# Patient Record
Sex: FEMALE | Race: WHITE | NOT HISPANIC OR LATINO | Employment: FULL TIME | URBAN - METROPOLITAN AREA
[De-identification: names, ages, dates, MRNs, and addresses within clinical notes are randomized per-mention and may not be internally consistent; named-entity substitution may affect disease eponyms.]

---

## 2020-04-07 ENCOUNTER — VIRTUAL VISIT (OUTPATIENT)
Dept: FAMILY MEDICINE | Facility: OTHER | Age: 30
End: 2020-04-07

## 2020-04-07 ENCOUNTER — NURSE TRIAGE (OUTPATIENT)
Dept: NURSING | Facility: CLINIC | Age: 30
End: 2020-04-07

## 2020-04-07 NOTE — TELEPHONE ENCOUNTER
Patient calling. States she had symptoms of sore throat, fever a few weeks ago so she completed 2 weeks of self quarantine. Now she wants to go back to work and employer said she needs a note to return.  Julissa Montes RN  [facility]

## 2020-04-07 NOTE — PROGRESS NOTES
"Date: 2020 16:20:22  Clinician: Yariel Torres  Clinician NPI: [id number]  Patient: Jacquelin De Leon  Patient : 1990  Patient Address: [address] [place] [address]  Patient Phone: [phone]  Visit Protocol: URI  Patient Summary:  Jacquelin is a 29 year old ( : 1990 ) female who initiated a Visit for COVID-19 (Coronavirus) evaluation and screening. When asked the question \"Please sign me up to receive news, health information and promotions. \", Jacquelin responded \"No\".    Jacquelin states her symptoms started gradually 2-3 weeks ago.   Jacquelin denies having rhinitis, facial pain or pressure, myalgias, sore throat, cough, nasal congestion, malaise, ear pain, enlarged lymph nodes, chills, diarrhea, vomiting, nausea, teeth pain, headache, fever, and wheezing. She also denies taking antibiotic medication for the symptoms, having recent facial or sinus surgery in the past 60 days, and double sickening (worsening symptoms after initial improvement). She is not experiencing dyspnea.    Pertinent COVID-19 (Coronavirus) information  Jacquelin has not traveled internationally or to the areas where COVID-19 (Coronavirus) is widespread, including cruise ship travel in the last 14 days before the start of her symptoms.   Jacquelin has not had a close contact with a laboratory-confirmed COVID-19 patient within 14 days of symptom onset. She has had a close contact with a suspected COVID-19 patient within 14 days of symptom onset. Additional information about contact with COVID-19 (Coronavirus) patient as reported by the patient (free text): I worked with two people that began to self quarantine as they came into contact with people displaying symptoms.   Jacquelin does not work or volunteer as healthcare worker or a  and does not work or volunteer in a healthcare facility. She does not live with a healthcare worker.   Triage Point(s) temporarily suspended for COVID-19 (Coronavirus) " "screening  Jacquelin reported the following symptoms which were previously protocol referral points. These protocol referral points have temporarily been removed for purposes of COVID-19 (Coronavirus) screening.   Denied all URI symptoms   Pertinent medical history  Jacquelin does not get yeast infections when she takes antibiotics.   Jacquelin needs a return to work/school note.   Weight: 190 lbs   Jacquelin smokes or uses smokeless tobacco.   She denies pregnancy and denies breastfeeding. She has menstruated in the past month.   Weight: 190 lbs  A synchronous phone visit was initiated by the provider for the following reason: To clarify exposure    MEDICATIONS: No current medications, ALLERGIES: NKDA  Clinician Response:  Dear Jacquelin,   Based on your report above, you have no symptoms of COVID-19,  it does not appear you need Coronavirus (COVID-19) testing at this time. Given what you have reported about your exposure, I would not recommend any additional isolation over the current CDC social distancing guidelines and frequent hand washing (see \"how can I protect myself...\" portion of handout included). If you develop symptoms, or the folks that you were in contact with develop symptoms, please make another oncare visit as our recommendations for isolation will likely change (see self monitoring section of handout included). Thank you and be well.      COVID-19 (Coronavirus) General Information  With the increase in the number of COVID-19 (Coronavirus) cases, we understand you may have some questions. Below is some helpful information on COVID-19 (Coronavirus).  How can I protect myself and others from the COVID-19 (Coronavirus)?  Because there is currently no vaccine to prevent infection, the best way to protect yourself is to avoid being exposed to this virus. Put distance between yourself and other people if COVID-19 (Coronavirus) is spreading in your community. The virus is thought to spread mainly from " person-to-person.     Between people who are in close contact with one another (within about 6 about) for a prolonged period (10 minutes or longer).    Through respiratory droplets produced when an infected person coughs or sneezes.     The CDC recommends the following additional steps to protect yourself and others:     Wash your hands often with soap and water for at least 20 seconds, especially after blowing your nose, coughing, or sneezing; going to the bathroom; and before eating or preparing food.  Use an alcohol-based hand  that contains at least 60 percent alcohol if soap and water are not available.        Avoid touching your eyes, nose and mouth with unwashed hands.    Avoid close contact with people who are sick.    Stay home when you are sick.    Cover your cough or sneeze with a tissue, then throw the tissue in the trash.    Clean and disinfect frequently touched objects and surfaces.     You can help stop COVID-19 (Coronavirus) by knowing the signs and symptoms:     Fever    Cough    Shortness of breath     Contact your healthcare provider if   Develop symptoms   AND   Have been in close contact with a person known to have COVID-19 (Coronavirus) or live in or have recently traveled from an area with ongoing spread of COVID-19 (Coronavirus). Call ahead before you go to a doctor's office or emergency room. Tell them about your recent travel and your symptoms.   For the most up to date information, visit the CDC's website.  Self-monitoring  Self-monitoring means people should monitor themselves for fever by taking their temperatures twice a day and remain alert for a cough or difficulty breathing.  It is important to check your health two times each day for 14 days after a potential exposure to a person with COVID-19 (Coronavirus) or after travel from a location where COVID-19 (Coronavirus) is widespread. If you have been exposed to a person with COVID-19 (Coronavirus), it may take up to 14 days  to know if you will get sick. Follow the steps below to check and record your health.     Take your temperature with a thermometer twice a day, once in the morning and once in the evening, and watch for a cough or difficulty breathing for 14 days.    Write down your temperature and any COVID-19 symptoms you may have: feeling feverish, coughing, or difficulty breathing.    Stay home from work or school.    Do not take public transportation, taxis, or ride-shares.    Avoid crowded places (such as shopping centers and movie theaters) and limit your activities in public.    Keep your distance from others (about 6 feet or 2 meters).    If you get sick with fever, cough, or trouble breathing, contact your healthcare provider and tell them about your recent travel and/or your symptoms.    If you need to seek medical care for other reasons, such as dialysis, call ahead to your doctor and tell them about your recent travel.     Steps to help prevent the spread of COVID-19 (Coronavirus) if you are sick  If you are sick with COVID-19 (Coronavirus) or suspect you are infected with the virus that causes COVID-19 (Coronavirus), follow the steps below to help prevent the disease from spreading&nbsp;to people in your home and community.     Stay home except to get medical care. Home isolation may be started in consultation with your healthcare clinician.    Separate yourself from other people and animals in your home.    Call ahead before visiting your doctor if you have a medical appointment.    Wear a facemask when you are around other people.    Cover your cough and sneezes.    Clean your hands often.    Avoid sharing personal household items.    Clean and disinfect frequently touched objects and surfaces everyday.    You will need to have someone drop off medications or household supplies (if needed) at your house without coming inside or in contact with you or others living in your house.    Monitor your symptoms and seek  "prompt medical care if your illness is worsening (e.g. Difficulty breathing).    Discontinue home isolation only in consultation with your healthcare provider.     For more detailed and up to date information on what to do if you are sick, visit this link: What to Do If You Are Sick With COVID-19.  Do I need to be tested for COVID-19 (Coronavirus)?     Not everyone needs to be tested for COVID-19 (Coronavirus). Decisions on which patients receive testing will be based on the local spread of COVID-19 (Coronavirus) as well as the symptoms. Your healthcare provider will make the final decision on whether you should be tested.    In the meantime, if you have concerns that you may have been exposed, it is reasonable to practice \"social distancing.\"&nbsp; If you are ill with a cold or flu-like illness, please monitor your symptoms and call your healthcare provider if your symptoms worsen.    For more up to date information, visit this link: COVID-19 (Coronavirus) Frequently Asked Questions and Answers.      Diagnosis: Counseling, unspecified  Diagnosis ICD: Z71.9  Triage Notes: I reviewed the patient's history, verified their identity, and explained the Visit process.    Unable to reach patient. Phone number disconnected. I was calling verify her report of exposure. But given what has been reported in history, I have provided recommendations.  Synchronous Triage: phone, status: completed, duration: 886 seconds  Addendum created: April 08 16:33:07, 2020 created by: Michele Finney body: I reviewed the e-visit and discussed the patient with the resident and agree with the resident's assessment and plan of care as documented.  "

## 2020-08-07 ENCOUNTER — HOSPITAL ENCOUNTER (EMERGENCY)
Facility: HOSPITAL | Age: 30
Discharge: HOME | End: 2020-08-07
Attending: NURSE PRACTITIONER | Admitting: NURSE PRACTITIONER

## 2020-08-07 ENCOUNTER — APPOINTMENT (OUTPATIENT)
Dept: GENERAL RADIOLOGY | Facility: HOSPITAL | Age: 30
End: 2020-08-07
Attending: NURSE PRACTITIONER

## 2020-08-07 VITALS
SYSTOLIC BLOOD PRESSURE: 125 MMHG | HEART RATE: 85 BPM | DIASTOLIC BLOOD PRESSURE: 76 MMHG | RESPIRATION RATE: 16 BRPM | TEMPERATURE: 98.6 F | OXYGEN SATURATION: 99 %

## 2020-08-07 DIAGNOSIS — L03.011 PARONYCHIA OF RIGHT MIDDLE FINGER: ICD-10-CM

## 2020-08-07 PROCEDURE — 73140 X-RAY EXAM OF FINGER(S): CPT | Mod: TC,RT

## 2020-08-07 PROCEDURE — 99203 OFFICE O/P NEW LOW 30 MIN: CPT | Mod: Z6 | Performed by: PHYSICIAN ASSISTANT

## 2020-08-07 PROCEDURE — G0463 HOSPITAL OUTPT CLINIC VISIT: HCPCS

## 2020-08-07 RX ORDER — SULFAMETHOXAZOLE/TRIMETHOPRIM 800-160 MG
1 TABLET ORAL 2 TIMES DAILY
Qty: 14 TABLET | Refills: 0 | Status: SHIPPED | OUTPATIENT
Start: 2020-08-07 | End: 2020-08-14

## 2020-08-07 ASSESSMENT — ENCOUNTER SYMPTOMS
FEVER: 0
CARDIOVASCULAR NEGATIVE: 1
PSYCHIATRIC NEGATIVE: 1
JOINT SWELLING: 1
RESPIRATORY NEGATIVE: 1
CONSTITUTIONAL NEGATIVE: 1
COLOR CHANGE: 1

## 2020-08-07 NOTE — ED AVS SNAPSHOT
[facility]  [address] [address]  Phone:  [phone]                                    Jacquelin De Leon   MRN: [id number]    Department:  [facility]   Date of Visit:  8/7/2020           After Visit Summary Signature Page    I have received my discharge instructions, and my questions have been answered. I have discussed any challenges I see with this plan with the nurse or doctor.    ..........................................................................................................................................  Patient/Patient Representative Signature      ..........................................................................................................................................  Patient Representative Print Name and Relationship to Patient    ..................................................               ................................................  Date                                   Time    ..........................................................................................................................................  Reviewed by Signature/Title    ...................................................              ..............................................  Date                                               Time          22EPIC Rev 08/18

## 2020-08-08 NOTE — ED PROVIDER NOTES
History     Chief Complaint   Patient presents with     Hand Pain     right middle finger, redness     HPI  Jacquelin De Leon is a 29 year old female who presents to  with worsening pain and swelling of RT middle finger. She notes some drainage after she soaks it at home. Does not recall sliver/FB. Denies chewing at nails. Denies fevers.  Concerned due to drainage, prompting visit tonight.     Allergies:  No Known Allergies    Problem List:    There are no active problems to display for this patient.       Past Medical History:    History reviewed. No pertinent past medical history.    Past Surgical History:    History reviewed. No pertinent surgical history.    Family History:    No family history on file.    Social History:  Marital Status:  Single [1]  Social History     Tobacco Use     Smoking status: None   Substance Use Topics     Alcohol use: None     Drug use: None        Medications:    sulfamethoxazole-trimethoprim (BACTRIM DS) 800-160 MG tablet          Review of Systems   Constitutional: Negative.  Negative for fever.   Respiratory: Negative.    Cardiovascular: Negative.    Musculoskeletal: Positive for joint swelling.   Skin: Positive for color change.   Psychiatric/Behavioral: Negative.        Physical Exam   BP: 125/76  Pulse: 85  Temp: 98.6  F (37  C)  Resp: 16  SpO2: 99 %      Physical Exam  Constitutional:       General: She is not in acute distress.     Appearance: She is not toxic-appearing.   Cardiovascular:      Rate and Rhythm: Normal rate.   Pulmonary:      Effort: Pulmonary effort is normal.   Musculoskeletal: Normal range of motion.         General: Swelling (RT middle finger) and tenderness present.   Skin:     Findings: Erythema (ulnar aspect along nail to DIP) present.   Neurological:      Mental Status: She is alert and oriented to person, place, and time.   Psychiatric:         Mood and Affect: Mood normal.         ED Course        Procedures         Results for orders placed or  performed during the hospital encounter of 08/07/20 (from the past 24 hour(s))   XR Finger Right G/E 2 Views    Narrative    PROCEDURE:  XR FINGER RT G/E 2 VW    HISTORY: right hand middle finger pain    COMPARISON:  None.    TECHNIQUE:  2 views of the right third finger were obtained.    FINDINGS:  No fracture or dislocation is identified. The joint spaces  are preserved.        Impression    IMPRESSION: Normal right third finger      ANASTACIO FINK MD       Medications - No data to display    Assessments & Plan (with Medical Decision Making)     I have reviewed the nursing notes.    I have reviewed the findings, diagnosis, plan and need for follow up with the patient.    Discharge Medication List as of 8/7/2020  9:36 PM      START taking these medications    Details   sulfamethoxazole-trimethoprim (BACTRIM DS) 800-160 MG tablet Take 1 tablet by mouth 2 times daily for 7 days, Disp-14 tablet,R-0, Local Print             Final diagnoses:   Paronychia of right middle finger     No FB on XR. Discussed continuing soaks as it does produce drainage. Will start bactrim if Sx persist over the next few days, sooner if worsening. RTC/UC with concerns for worsening despite Tx. Pt agreeable to plan and discharged home in stable condition.     8/7/2020   [facility]     Robert Meadows PA  08/07/20 7206

## 2020-08-08 NOTE — DISCHARGE INSTRUCTIONS
"Continue soaks (add epsom salt) and monitoring the drainage.   Topical antibiotics ointment and band aid gently.   IF things get worse (redness/pain/swelling) despite the soaks and drainage, start antibiotics.   Eat yogurt, kefir or take over-the-counter \"probiotic\" at least 2 hours before or after a dose of antibiotic. This will replace good bacteria that may have been lost due to the antibiotic. (This may also help to prevent yeast infections and upset stomach during the course of antibiotic.)  ** Worse = antibiotics if it takes 5-7 days =  antibiotic. **   "

## 2020-08-08 NOTE — ED TRIAGE NOTES
Pt is here with c/o right hand middle finger pain   pt reports she thinks it was a hang nail and it swollen and theres drainage ongoing x 4 days

## 2020-08-26 ENCOUNTER — HOSPITAL ENCOUNTER (EMERGENCY)
Facility: HOSPITAL | Age: 30
Discharge: HOME | End: 2020-08-26
Attending: FAMILY MEDICINE | Admitting: FAMILY MEDICINE

## 2020-08-26 ENCOUNTER — APPOINTMENT (OUTPATIENT)
Dept: GENERAL RADIOLOGY | Facility: HOSPITAL | Age: 30
End: 2020-08-26
Attending: FAMILY MEDICINE

## 2020-08-26 VITALS
WEIGHT: 190 LBS | HEIGHT: 65 IN | SYSTOLIC BLOOD PRESSURE: 123 MMHG | OXYGEN SATURATION: 98 % | DIASTOLIC BLOOD PRESSURE: 77 MMHG | TEMPERATURE: 98.8 F | HEART RATE: 84 BPM | RESPIRATION RATE: 16 BRPM | BODY MASS INDEX: 31.65 KG/M2
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
## 2020-08-26 DIAGNOSIS — S52.124A CLOSED NONDISPLACED FRACTURE OF HEAD OF RIGHT RADIUS, INITIAL ENCOUNTER: ICD-10-CM

## 2020-08-26 PROCEDURE — 73080 X-RAY EXAM OF ELBOW: CPT | Mod: TC,RT

## 2020-08-26 PROCEDURE — 99283 EMERGENCY DEPT VISIT LOW MDM: CPT | Mod: Z6 | Performed by: FAMILY MEDICINE

## 2020-08-26 PROCEDURE — 99283 EMERGENCY DEPT VISIT LOW MDM: CPT

## 2020-08-26 ASSESSMENT — ENCOUNTER SYMPTOMS
NECK STIFFNESS: 0
BACK PAIN: 0
CONFUSION: 0
SHORTNESS OF BREATH: 0
COLOR CHANGE: 0
HEADACHES: 0
ABDOMINAL PAIN: 0
FEVER: 0
EYE REDNESS: 0
DIFFICULTY URINATING: 0

## 2020-08-26 ASSESSMENT — MIFFLIN-ST. JEOR: SCORE: 1582.71

## 2020-08-26 NOTE — LETTER
August 26, 2020      To Whom It May Concern:      Jacquelin De Leon was seen in our Emergency Department today, 08/26/20.  I expect her condition to improve over the next 2 - 3 weeks.  She may return to work on 8/27/20 with work activities as limited only by pain.    Sincerely,        Kenrick Matias MD

## 2020-08-26 NOTE — ED NOTES
Dr Matias to the bedside to talk with pt about x-ray results and treatment plan. Discharge teaching done, AVS reviewed with pt, questions answered. Pt verbalized understanding and is agreeable with discharge plan. Pt discharged to home.

## 2020-08-26 NOTE — ED NOTES
Pt presents ambulatory with c/o Rt mid arm pain s/p sleep walking and falling down some stairs. Pt denies head, neck, chest, abdomen, pelvis pain. Pt is able to flex her Rt elbow somewhat but this increases pain both up into the upper and lower arm. No deformity, discoloration or wounds noted.

## 2020-08-26 NOTE — ED AVS SNAPSHOT
[facility]  [address] [address]  Phone:  [phone]                                    Jaqcuelin De Leon   MRN: [id number]    Department:  [facility]   Date of Visit:  8/26/2020           After Visit Summary Signature Page    I have received my discharge instructions, and my questions have been answered. I have discussed any challenges I see with this plan with the nurse or doctor.    ..........................................................................................................................................  Patient/Patient Representative Signature      ..........................................................................................................................................  Patient Representative Print Name and Relationship to Patient    ..................................................               ................................................  Date                                   Time    ..........................................................................................................................................  Reviewed by Signature/Title    ...................................................              ..............................................  Date                                               Time          22EPIC Rev 08/18

## 2020-08-26 NOTE — ED PROVIDER NOTES
"  History     Chief Complaint   Patient presents with     Arm Pain     pt fell, Rt arm and elbow hurting now     HPI  Jacquelin De Leon is a 30 year old woman who was sleep walking in her new home when she fell backward down several stairs. She struck her right elbow when she fell, but did not strike her head/neck, reports no neck pain and does not have pain anywhere else. She is able to move her right elbow normally, but feels pain when she does.      Allergies:  No Known Allergies    Problem List:    There are no active problems to display for this patient.       Past Medical History:    History reviewed. No pertinent past medical history.    Past Surgical History:    History reviewed. No pertinent surgical history.    Family History:    History reviewed. No pertinent family history.    Social History:  Marital Status:  Single [1]  Social History     Tobacco Use     Smoking status: Former Smoker     Types: Cigarettes     Smokeless tobacco: Never Used   Substance Use Topics     Alcohol use: Yes     Comment: rarely     Drug use: Never        Medications:    No current outpatient medications on file.        Review of Systems   Constitutional: Negative for fever.   HENT: Negative for congestion.    Eyes: Negative for redness.   Respiratory: Negative for shortness of breath.    Cardiovascular: Negative for chest pain.   Gastrointestinal: Negative for abdominal pain.   Genitourinary: Negative for difficulty urinating.   Musculoskeletal: Negative for back pain and neck stiffness.   Skin: Negative for color change.   Neurological: Negative for headaches.   Psychiatric/Behavioral: Negative for confusion.       Physical Exam   BP: 123/77  Pulse: 84  Temp: 98.8  F (37.1  C)  Resp: 16  Height: 165.1 cm (5' 5\")  Weight: 86.2 kg (190 lb)  SpO2: 98 %      Physical Exam    General Appearance: well-developed, well-nourished, alert & oriented, no apparent distress.    HEENT: atraumatic. Pupils equal, round & reactive to light, " extraocular movements intact. Bilateral ear canals clear. Nose without rhinorrhea or epistaxis. Clear oropharynx. Moist mucous membranes.    Neck: normal inspection, non-tender (specifically no posterior midline tenderness), full & painless ROM, supple, no lymphadenopathy or nuchal rigidity. No jugular venous distension.    Cardiovascular: regular rate, rhythm, normal S1 & S2, no murmurs, rubs or gallops.    Respiratory: clear lung sounds with good air entry, no wheezes rales or rhonchi, no acute respiratory distress.    Gastrointestinal: normal inspection, normal bowel sounds, non-tender, no masses or organomegaly.    Extremities: normal inspection without abrasion, laceration or ecchymosis of right elbow. No other injury visualized to upper/lower extremities. 2+/4+ pulses bilaterally, left arm with normal & painless ROM, mild pain with palpation of right elbow. No pain with palpation of humerus, radius, ulna, carpals or phalanges. Mild pain with flexion and extension of right arm. Full supination and pronation only mildly exacerbates pain. Joints normal.    Neurologic: CN II - XII intact, no motor/sensory deficit.    Skin: normal color, no skin rash.    ED Course        Procedures           No results found for this or any previous visit (from the past 24 hour(s)).    Medications - No data to display    Assessments & Plan (with Medical Decision Making)   The patient is a 30 year old woman who fell down stairs sustaining a subtle right radial head fracture without dislocation.    1) Right elbow contusion - subtle acute radial head fracture suspected. No displacement (Sirnath type I). Patient will be placed in a posterior splint acutely, but encouraged to exercise her full ROM of supination, pronation, flexion and extension within 24 - 48 hours to improve her recovery. Patient will be treated with rest, cold compresses, elevation and alternating APAP/ibuprofen.    Plan for orthopedic follow-up in 3 - 5 days regarding  this ER visit. The patient verbalizes agreement with this plan as well as to immediately return to the ER with any new/worsening S/Sx.      I have reviewed the nursing notes.    I have reviewed the findings, diagnosis, plan and need for follow up with the patient.    New Prescriptions    No medications on file       Final diagnoses:   Fall down stairs, initial encounter   Closed nondisplaced fracture of head of right radius, initial encounter       8/26/2020   [facility]     Kenrick Matias MD  08/26/20 0516       Kenrick Matias MD  08/26/20 0518

## 2021-07-12 ENCOUNTER — HOSPITAL ENCOUNTER (EMERGENCY)
Facility: HOSPITAL | Age: 31
Discharge: HOME | End: 2021-07-12
Attending: NURSE PRACTITIONER | Admitting: NURSE PRACTITIONER
Payer: COMMERCIAL

## 2021-07-12 VITALS
HEART RATE: 101 BPM | SYSTOLIC BLOOD PRESSURE: 135 MMHG | OXYGEN SATURATION: 97 % | DIASTOLIC BLOOD PRESSURE: 74 MMHG | RESPIRATION RATE: 16 BRPM | TEMPERATURE: 97.9 F

## 2021-07-12 DIAGNOSIS — J02.9 ACUTE VIRAL PHARYNGITIS: ICD-10-CM

## 2021-07-12 LAB — GROUP A STREP BY PCR: NOT DETECTED

## 2021-07-12 PROCEDURE — 99213 OFFICE O/P EST LOW 20 MIN: CPT | Performed by: NURSE PRACTITIONER

## 2021-07-12 PROCEDURE — G0463 HOSPITAL OUTPT CLINIC VISIT: HCPCS

## 2021-07-12 PROCEDURE — 87651 STREP A DNA AMP PROBE: CPT | Performed by: NURSE PRACTITIONER

## 2021-07-12 ASSESSMENT — ENCOUNTER SYMPTOMS
LIGHT-HEADEDNESS: 0
APPETITE CHANGE: 0
NAUSEA: 0
DIZZINESS: 0
RHINORRHEA: 0
EYES NEGATIVE: 1
FEVER: 1
COUGH: 1
VOMITING: 0
HEADACHES: 0
SHORTNESS OF BREATH: 1
ACTIVITY CHANGE: 1
SINUS PAIN: 0
FATIGUE: 1
TROUBLE SWALLOWING: 1
MYALGIAS: 1
DIARRHEA: 0
CHILLS: 0
SORE THROAT: 1
SINUS PRESSURE: 0

## 2021-07-12 NOTE — DISCHARGE INSTRUCTIONS
Increase oral intake, cool mist vaporizer as needed, rest, avoid sharing utensils, practice good hand washing techniques, cover mouth when you cough and sneeze.  Over the counter medications such as ibuprofen and/or acetaminophen for fever and generalized aches and pains. Ibuprofen 400 to 800 mg (2 - 4 tabs of over the counter med) every six to eight hours as needed;not to exceed maximum amount of 3200 mg in 24 hours.Tylenol 650 to 1000 mg every four to six hours as needed (not to exceed more than 4000 mg in a 24 hour period). May use interchangeably. Robitussin (guaifenesin) for cough. Chest rubs such as Darell's or Mentholatum may help reduce sore throat symptoms.  Chloraseptic spray for sore throat or menthol lozenges may be helpful for sore throat. Be reevaluated if symptoms persist longer than 10 - 14 days or worsen and if there is no improvement in 72 hours or worsening of symptoms.  Increase fluids.      OTC decongestants (oral or topical).  Decongestants (oral or topical) cause vasoconstriction of the nasal mucosa.  We prefer oral pseudoephedrine to phenylephrine and other oral OTC nasal decongestants. Side effects of oral decongestants may include tachycardia, elevated diastolic blood pressure, and palpitations. Pseudoephedrine 30 to 60 mg every four to six hours as needed for congestion. (Maximum dose is 240 mg in 24 hours). Do not use longer than 72 hours.    Commonly used topical decongestants include oxymetazoline, xylometazoline, and phenylephrine. Side effects of topical decongestants include nosebleeds and drying of the nasal membranes. Topical decongestants should only be used for two to three days; longer use may result in rebound nasal congestion after discontinuation.    Fluids, herbs, and foods for sore throat relief -- Adjusting the temperature and texture of foods and beverages may provide local relief of sore throat pain. While data showing benefit are quite limited, these approaches are  intuitive. We typically advise these measures since they are likely to be safe with minimal adverse effect, and patients often describe relief of symptoms.  We suggest hydration with frozen (eg, ice or popsicles) or heated liquids (eg, teas, soups), rather than room temperature or refrigerated fluids in patient with significant sore throat pain. Very cold foods can have a numbing-like effect that temporarily reduces or alleviates the pain of swallowing. Ice cubes or frozen popsicles facilitate hydration; ice cream and frozen yogurt provide caloric intake.  Warm fluids and foods, including teas, soups, and soft non-irritating foods, may be better tolerated by patients with throat pain than irritating foods (eg, rough-textured or spicy foods) or fluids at room temperatures. Foods that coat the throat, including honey and hard candies, can facilitate intake of calories while temporarily relieving throat pain.

## 2021-07-12 NOTE — ED TRIAGE NOTES
Sore throat, dry cough, and body aches. Symptoms started when she woke up this morning. Refuses covid swab due to vaccination. Pt states she took two ibuprofen 200mg this morning.

## 2021-07-12 NOTE — ED PROVIDER NOTES
History     Chief Complaint   Patient presents with     Cough     Pharyngitis     HPI  Jacquelin De Leon is a 30 year old female who presents with symptoms of fatigue, feels warm, sore throat with painful swallowing, cough, shortness of breath, and body aches.  Symptoms started this morning.  She has received a Covid vaccination.  Her boyfriend had strep 1 month ago.  Vapes.  Has taken ibuprofen for her symptoms: helps a little bit. Denies chills, nausea, vomiting, and diarrhea.    No Known Allergies    Problem List:    There are no problems to display for this patient.       Past Medical History:    History reviewed. No pertinent past medical history.    Past Surgical History:    History reviewed. No pertinent surgical history.    Family History:    History reviewed. No pertinent family history.    Social History:  Marital Status:  Single [1]  Social History     Tobacco Use     Smoking status: Former Smoker     Types: Cigarettes     Smokeless tobacco: Never Used   Substance Use Topics     Alcohol use: Yes     Comment: rarely     Drug use: Never        Medications:    No current outpatient medications on file.        Review of Systems   Constitutional: Positive for activity change, fatigue and fever (feels warm). Negative for appetite change and chills.   HENT: Positive for sore throat and trouble swallowing. Negative for ear pain, rhinorrhea, sinus pressure and sinus pain.    Eyes: Negative.    Respiratory: Positive for cough and shortness of breath.    Gastrointestinal: Negative for diarrhea, nausea and vomiting.   Genitourinary: Negative.    Musculoskeletal: Positive for myalgias.   Neurological: Negative for dizziness, light-headedness and headaches.       Physical Exam   BP: 135/74  Pulse: 101  Temp: 97.9  F (36.6  C)  Resp: 16  SpO2: 97 %      Physical Exam  Vitals and nursing note reviewed.   Constitutional:       General: She is in acute distress (Mild).      Appearance: She is overweight.   HENT:       Head: Normocephalic.      Right Ear: Tympanic membrane and ear canal normal.      Left Ear: Tympanic membrane and ear canal normal.      Nose: Nose normal.      Right Sinus: No maxillary sinus tenderness or frontal sinus tenderness.      Left Sinus: No maxillary sinus tenderness or frontal sinus tenderness.      Mouth/Throat:      Lips: Pink.      Mouth: Mucous membranes are moist.      Pharynx: Uvula midline. Posterior oropharyngeal erythema (Mild) present.   Eyes:      Conjunctiva/sclera: Conjunctivae normal.   Cardiovascular:      Rate and Rhythm: Regular rhythm. Tachycardia present.      Heart sounds: Normal heart sounds. No murmur heard.     Pulmonary:      Effort: Pulmonary effort is normal. No respiratory distress.      Breath sounds: Normal breath sounds. No wheezing.   Lymphadenopathy:      Cervical: Cervical adenopathy (Small) present.      Right cervical: Superficial cervical adenopathy present.      Left cervical: Superficial cervical adenopathy present.   Skin:     General: Skin is warm and dry.   Neurological:      Mental Status: She is alert and oriented to person, place, and time.   Psychiatric:         Behavior: Behavior normal.         ED Course        Procedures             No results found for this or any previous visit (from the past 24 hour(s)).    Medications - No data to display    Assessments & Plan (with Medical Decision Making)     I have reviewed the nursing notes.    I have reviewed the findings, diagnosis, plan and need for follow up with the patient.  (J02.9) Acute viral pharyngitis  Comment: 30 year old female who presents with symptoms of fatigue, feels warm, sore throat with painful swallowing, cough, shortness of breath, and body aches.  Symptoms started this morning.  She has received a Covid vaccination.  Her boyfriend had strep 1 month ago.  Vapes.  Has taken ibuprofen for her symptoms: helps a little bit. Denies chills, nausea, vomiting, and diarrhea.    MDM:NHT. Lungs  CTA    Strep screen negative    Plan: Education provided for acute viral pharyngitis. Treat symptoms conservatively with acetaminophen and  ibuprofen (if applicable) for fevers, body aches, and headaches, guaifenesin and/or honey for cough. May use chest rubs for sore throat and congestion, hot and cold liquids may help decrease sore throat and help you feel better. Increase fluids. You may utilize pseudoephedrine for congestion. Return to be reevaluated by ER/UC or your primary care provider if symptoms worsen, you develop breathing difficulties, or you do not improve in a reasonable time frame. It can take several days for a cough to resolve. It can take ten to fourteen days for upper respiratory symptoms to resolve.   These discharge instructions and medications were reviewed with her and understanding verbalized.    This document was prepared using a combination of typing and voice generated software.  While every attempt was made for accuracy, spelling and grammatical errors may exist.    There are no discharge medications for this patient.      Final diagnoses:   Acute viral pharyngitis       7/12/2021   [facility]       Niesha Curtis, CNP  07/14/21 1531

## 2021-07-12 NOTE — Clinical Note
Jacquelin De Leon was seen and treated in our emergency department on 7/12/2021.  She may return to work on 07/13/2021.  Evaluated in Urgent Care. Negative strep test.      If you have any questions or concerns, please don't hesitate to call.      Niesha Curtis, CNP

## 2021-07-28 ENCOUNTER — HOSPITAL ENCOUNTER (EMERGENCY)
Facility: HOSPITAL | Age: 31
Discharge: HOME | End: 2021-07-28
Attending: PHYSICIAN ASSISTANT | Admitting: PHYSICIAN ASSISTANT
Payer: COMMERCIAL

## 2021-07-28 VITALS
SYSTOLIC BLOOD PRESSURE: 132 MMHG | TEMPERATURE: 98.4 F | HEART RATE: 96 BPM | DIASTOLIC BLOOD PRESSURE: 83 MMHG | OXYGEN SATURATION: 96 % | RESPIRATION RATE: 16 BRPM

## 2021-07-28 DIAGNOSIS — S61.219A FINGER LACERATION: ICD-10-CM

## 2021-07-28 PROCEDURE — 12001 RPR S/N/AX/GEN/TRNK 2.5CM/<: CPT

## 2021-07-28 PROCEDURE — 99282 EMERGENCY DEPT VISIT SF MDM: CPT | Mod: 25

## 2021-07-28 PROCEDURE — 12001 RPR S/N/AX/GEN/TRNK 2.5CM/<: CPT | Performed by: PHYSICIAN ASSISTANT

## 2021-07-28 ASSESSMENT — ENCOUNTER SYMPTOMS: BRUISES/BLEEDS EASILY: 0

## 2021-07-29 NOTE — DISCHARGE INSTRUCTIONS
Watch for infection.    Keep wound clean.    You may wash the area with soap and water starting tomorrow.    Ibuprofen and Tylenol for pain.    Return here for any concerns.    Suture removal in 7 to 10 days.

## 2021-07-29 NOTE — ED PROVIDER NOTES
History     Chief Complaint   Patient presents with     Laceration     The history is provided by the patient.     Jacquelin De Leon is a 30 year old female who presented to the urgent care ambulatory for evaluation of a laceration of the finger.  She was peeling potatoes and sustained the injury.  No other concerns.  Reports being up-to-date on her immunizations.    Allergies:  No Known Allergies    Problem List:    There are no problems to display for this patient.       Past Medical History:    History reviewed. No pertinent past medical history.    Past Surgical History:    History reviewed. No pertinent surgical history.    Family History:    No family history on file.    Social History:  Marital Status:  Single [1]  Social History     Tobacco Use     Smoking status: Former Smoker     Types: Cigarettes     Smokeless tobacco: Never Used   Substance Use Topics     Alcohol use: Yes     Comment: rarely     Drug use: Never        Medications:    No current outpatient medications on file.        Review of Systems   Musculoskeletal:        Finger laceration   Allergic/Immunologic: Negative for immunocompromised state.   Hematological: Does not bruise/bleed easily.       Physical Exam   BP: 132/83  Pulse: 96  Temp: 98.4  F (36.9  C)  Resp: 16  SpO2: 96 %      Physical Exam  Vitals and nursing note reviewed.   Constitutional:       General: She is not in acute distress.     Appearance: Normal appearance. She is not ill-appearing, toxic-appearing or diaphoretic.   Musculoskeletal:      Comments: 2 cm U-shaped laceration to the lateral aspect of the left index finger distal to the DIP.  Normal range of motion.  Normal sensation.  Capillary refill less than 2 seconds.   Skin:     General: Skin is warm and dry.      Capillary Refill: Capillary refill takes less than 2 seconds.   Neurological:      General: No focal deficit present.      Mental Status: She is alert and oriented to person, place, and time.   Psychiatric:          Mood and Affect: Mood normal.         ED Course        Procedures              Critical Care time:  none               No results found for this or any previous visit (from the past 24 hour(s)).    Medications - No data to display    Assessments & Plan (with Medical Decision Making)   No concerning findings on exam.  There is no evidence of deep structure involvement.  Normal range of motion.  Laceration to the lateral aspect of the finger.  With verbal consent the area was anesthetized with approximately 2 mL of 2% lidocaine without epinephrine obtaining an excellent anesthetic result.  Finger tourniquet was placed.  The laceration was copiously irrigated with approximately 80 mL of normal saline.  There is no evidence of foreign body.  Again, there is no evidence of obstruction involvement.  Laceration was approximated with #4, 4-0 Ethilon interrupted sutures.  Bandaged in the usual fashion.  Advised to monitor for infection.  Suture removal in 7 to 10 days.  Return here for any other questions or concerns.    This document was prepared using a combination of typing and voice generated software.  While every attempt was made for accuracy, spelling and grammatical errors may exist.    I have reviewed the nursing notes.    I have reviewed the findings, diagnosis, plan and need for follow up with the patient.       New Prescriptions    No medications on file       Final diagnoses:   Finger laceration       7/28/2021   [facility]     Charo Pozo PA-C  07/28/21 7774

## 2021-10-17 ENCOUNTER — HOSPITAL ENCOUNTER (EMERGENCY)
Facility: HOSPITAL | Age: 31
End: 2021-10-17
Payer: COMMERCIAL

## 2021-10-18 ENCOUNTER — HOSPITAL ENCOUNTER (EMERGENCY)
Facility: HOSPITAL | Age: 31
Discharge: HOME | End: 2021-10-18
Attending: STUDENT IN AN ORGANIZED HEALTH CARE EDUCATION/TRAINING PROGRAM | Admitting: STUDENT IN AN ORGANIZED HEALTH CARE EDUCATION/TRAINING PROGRAM
Payer: COMMERCIAL

## 2021-10-18 ENCOUNTER — APPOINTMENT (OUTPATIENT)
Dept: GENERAL RADIOLOGY | Facility: HOSPITAL | Age: 31
End: 2021-10-18
Attending: STUDENT IN AN ORGANIZED HEALTH CARE EDUCATION/TRAINING PROGRAM
Payer: COMMERCIAL

## 2021-10-18 VITALS
RESPIRATION RATE: 18 BRPM | HEART RATE: 85 BPM | DIASTOLIC BLOOD PRESSURE: 81 MMHG | TEMPERATURE: 98.6 F | SYSTOLIC BLOOD PRESSURE: 121 MMHG | OXYGEN SATURATION: 99 %

## 2021-10-18 DIAGNOSIS — R07.9 CHEST PAIN, UNSPECIFIED TYPE: ICD-10-CM

## 2021-10-18 LAB
ANION GAP SERPL CALCULATED.3IONS-SCNC: 4 MMOL/L (ref 3–14)
BUN SERPL-MCNC: 15 MG/DL (ref 7–30)
CALCIUM SERPL-MCNC: 8.2 MG/DL (ref 8.5–10.1)
CHLORIDE BLD-SCNC: 112 MMOL/L (ref 94–109)
CO2 SERPL-SCNC: 25 MMOL/L (ref 20–32)
CREAT SERPL-MCNC: 0.53 MG/DL (ref 0.52–1.04)
ERYTHROCYTE [DISTWIDTH] IN BLOOD BY AUTOMATED COUNT: 13.1 % (ref 10–15)
GFR SERPL CREATININE-BSD FRML MDRD: >90 ML/MIN/1.73M2
GLUCOSE BLD-MCNC: 100 MG/DL (ref 70–99)
HCT VFR BLD AUTO: 41.7 % (ref 35–47)
HGB BLD-MCNC: 13.7 G/DL (ref 11.7–15.7)
MCH RBC QN AUTO: 30.4 PG (ref 26.5–33)
MCHC RBC AUTO-ENTMCNC: 32.9 G/DL (ref 31.5–36.5)
MCV RBC AUTO: 93 FL (ref 78–100)
PLATELET # BLD AUTO: 395 10E3/UL (ref 150–450)
POTASSIUM BLD-SCNC: 3.7 MMOL/L (ref 3.4–5.3)
RBC # BLD AUTO: 4.5 10E6/UL (ref 3.8–5.2)
SODIUM SERPL-SCNC: 141 MMOL/L (ref 133–144)
TROPONIN I SERPL-MCNC: <0.015 UG/L (ref 0–0.04)
WBC # BLD AUTO: 5.4 10E3/UL (ref 4–11)

## 2021-10-18 PROCEDURE — 93010 ELECTROCARDIOGRAM REPORT: CPT | Performed by: INTERNAL MEDICINE

## 2021-10-18 PROCEDURE — 93005 ELECTROCARDIOGRAM TRACING: CPT

## 2021-10-18 PROCEDURE — 36415 COLL VENOUS BLD VENIPUNCTURE: CPT | Performed by: STUDENT IN AN ORGANIZED HEALTH CARE EDUCATION/TRAINING PROGRAM

## 2021-10-18 PROCEDURE — 99284 EMERGENCY DEPT VISIT MOD MDM: CPT | Performed by: STUDENT IN AN ORGANIZED HEALTH CARE EDUCATION/TRAINING PROGRAM

## 2021-10-18 PROCEDURE — 80048 BASIC METABOLIC PNL TOTAL CA: CPT | Performed by: STUDENT IN AN ORGANIZED HEALTH CARE EDUCATION/TRAINING PROGRAM

## 2021-10-18 PROCEDURE — 84484 ASSAY OF TROPONIN QUANT: CPT | Performed by: STUDENT IN AN ORGANIZED HEALTH CARE EDUCATION/TRAINING PROGRAM

## 2021-10-18 PROCEDURE — 71046 X-RAY EXAM CHEST 2 VIEWS: CPT

## 2021-10-18 PROCEDURE — 99285 EMERGENCY DEPT VISIT HI MDM: CPT | Mod: 25

## 2021-10-18 PROCEDURE — 250N000013 HC RX MED GY IP 250 OP 250 PS 637: Performed by: STUDENT IN AN ORGANIZED HEALTH CARE EDUCATION/TRAINING PROGRAM

## 2021-10-18 PROCEDURE — 85014 HEMATOCRIT: CPT | Performed by: STUDENT IN AN ORGANIZED HEALTH CARE EDUCATION/TRAINING PROGRAM

## 2021-10-18 PROCEDURE — 96374 THER/PROPH/DIAG INJ IV PUSH: CPT

## 2021-10-18 PROCEDURE — 250N000009 HC RX 250: Performed by: STUDENT IN AN ORGANIZED HEALTH CARE EDUCATION/TRAINING PROGRAM

## 2021-10-18 RX ORDER — ONDANSETRON 2 MG/ML
4 INJECTION INTRAMUSCULAR; INTRAVENOUS EVERY 30 MIN PRN
Status: DISCONTINUED | OUTPATIENT
Start: 2021-10-18 | End: 2021-10-18 | Stop reason: HOSPADM

## 2021-10-18 RX ADMIN — LIDOCAINE HYDROCHLORIDE 30 ML: 20 SOLUTION ORAL; TOPICAL at 09:07

## 2021-10-18 NOTE — ED TRIAGE NOTES
Reports yesterday chest pain started at 1000, worse with deep breathing, bending over. Reports this is causing SOB. Denies Hx of blood clots. Cough also started yesterday. Denies any injury. Not worse with palpation.

## 2021-10-18 NOTE — Clinical Note
Jacquelin De Leon was seen and treated in our emergency department on 10/18/2021.  She may return to work on 10/19/2021.       If you have any questions or concerns, please don't hesitate to call.      Gerry Peres MD

## 2021-10-18 NOTE — ED NOTES
Patient given discharge instructions to follow up with PCP.     Patient verbalized understanding. Patient condition improved upon discharge.

## 2021-10-18 NOTE — ED PROVIDER NOTES
History     Chief Complaint   Patient presents with     Chest Pain     HPI  Jacquelin De Leon is a 31 year old female with no relevant past medical history presents to the emergency department today complaining of chest pain.  It is pleuritic, started yesterday with no specific event that led up to it, is worsened by changes in position, reaching in certain directions.  No fever, cough, abdominal pain, nausea, vomiting, diarrhea.  No trauma.  No history of heart disease.    Allergies:  No Known Allergies    Problem List:    There are no problems to display for this patient.       Past Medical History:    History reviewed. No pertinent past medical history.    Past Surgical History:    History reviewed. No pertinent surgical history.    Family History:    History reviewed. No pertinent family history.    Social History:  Marital Status:  Single [1]  Social History     Tobacco Use     Smoking status: Former Smoker     Types: Cigarettes     Smokeless tobacco: Never Used   Substance Use Topics     Alcohol use: Not Currently     Comment: rarely     Drug use: Never        Medications:    No current outpatient medications on file.        Review of Systems  A complete review of systems was performed and is otherwise negative.     Physical Exam   BP: 121/81  Pulse: 85  Temp: 98.6  F (37  C)  Resp: 18  SpO2: 99 %      Physical Exam  Constitutional: Alert and conversant. NAD   HENT: NCAT   Eyes: Normal pupils   Neck: supple   CV: Normal rate, regular rhythm, no murmur   Pulmonary/Chest: Non-labored respirations, clear to auscultation bilaterally, no reproducible chest pain on palpation  Abdominal: Soft, non-tender, non-distended   MSK: TREJO.   Neuro: Alert and appropriate   Skin: Warm and dry. No diaphoresis. No rashes on exposed skin    Psych: Appropriate mood and affect     ED Course     ED Course as of Oct 18 0953   Mon Oct 18, 2021   0938 Jacquelin De Leon is a 31 year old female presenting with chest pain.     Differential includes but is not limited to acute coronary syndrome, pulmonary embolism, pneumonia, aortic dissection, pneumothorax, GERD, pericarditis, myocarditis, MSK/costochondritis, shingles.    Vitals within normal limits reassuring  Exam reassuring  EKG without signs of acute ischemia, normal sinus rhythm, no arrhythmias, intervals normal  CXR within normal limits, no concerning findings, nothing grossly in the presentation.  No pneumonia, no pneumothorax  Labs essentially all within normal limits, mild minor variations in chloride and calcium are clinically insignificant.  Reassuring  HEART Score 0     History and exam suggest a low likelihood of pulmonary embolism, aortic dissection, or pulmonary pathology as the etiology of this patient's pain.      Given the patient's few risk factors and atypical history for acute coronary syndrome with studies results suggesting low suspicion of a coronary event, the patient is stable for outpatient management. The etiology of the chest pain is unclear. Will trial therapy prescription for ibuprofen . Given the nature of the patient's symptoms a stress echo with cardiology follow up is not necessary and has not been scheduled.  Patient discharged in stable condition with all questions answered and return precautions given.        Procedures              Results for orders placed or performed during the hospital encounter of 10/18/21 (from the past 24 hour(s))   CBC with platelets   Result Value Ref Range    WBC Count 5.4 4.0 - 11.0 10e3/uL    RBC Count 4.50 3.80 - 5.20 10e6/uL    Hemoglobin 13.7 11.7 - 15.7 g/dL    Hematocrit 41.7 35.0 - 47.0 %    MCV 93 78 - 100 fL    MCH 30.4 26.5 - 33.0 pg    MCHC 32.9 31.5 - 36.5 g/dL    RDW 13.1 10.0 - 15.0 %    Platelet Count 395 150 - 450 10e3/uL   Basic metabolic panel   Result Value Ref Range    Sodium 141 133 - 144 mmol/L    Potassium 3.7 3.4 - 5.3 mmol/L    Chloride 112 (H) 94 - 109 mmol/L    Carbon Dioxide (CO2) 25 20 - 32  mmol/L    Anion Gap 4 3 - 14 mmol/L    Urea Nitrogen 15 7 - 30 mg/dL    Creatinine 0.53 0.52 - 1.04 mg/dL    Calcium 8.2 (L) 8.5 - 10.1 mg/dL    Glucose 100 (H) 70 - 99 mg/dL    GFR Estimate >90 >60 mL/min/1.73m2   Troponin I   Result Value Ref Range    Troponin I <0.015 0.000 - 0.045 ug/L   XR Chest 2 Views    Narrative    PROCEDURE:  XR CHEST 2 VW    HISTORY:  chest pain.     COMPARISON:  None.    FINDINGS:   The cardiac silhouette is normal in size. The pulmonary vasculature is  normal.  The lungs are clear. No pleural effusion or pneumothorax.      Impression    IMPRESSION:  No acute cardiopulmonary disease.      ANASTACIO FINK MD         SYSTEM ID:  [id number]       Medications   ondansetron (ZOFRAN) injection 4 mg (has no administration in time range)   lidocaine (XYLOCAINE) 2 % 15 mL, alum & mag hydroxide-simethicone (MAALOX) 15 mL GI Cocktail (30 mLs Oral Given 10/18/21 0907)       Assessments & Plan (with Medical Decision Making)     I have reviewed the nursing notes.    I have reviewed the findings, diagnosis, plan and need for follow up with the patient.    New Prescriptions    No medications on file       Final diagnoses:   Chest pain, unspecified type       10/18/2021   [facility]     Gerry Peres MD  10/18/21 1072

## 2021-10-18 NOTE — DISCHARGE INSTRUCTIONS
Return to the emergency department if you have worsening symptoms or new concerning symptoms.  For now I would like you to use ibuprofen, you can take 600 mg 3-4 times a day.  Make sure you are taking it with food.  Follow-up with your primary care provider in the next week.  Call to schedule an appointment.

## 2022-02-10 ENCOUNTER — HOSPITAL ENCOUNTER (EMERGENCY)
Facility: HOSPITAL | Age: 32
Discharge: HOME | End: 2022-02-10
Attending: NURSE PRACTITIONER | Admitting: NURSE PRACTITIONER
Payer: COMMERCIAL

## 2022-02-10 VITALS
DIASTOLIC BLOOD PRESSURE: 75 MMHG | RESPIRATION RATE: 20 BRPM | TEMPERATURE: 99.4 F | SYSTOLIC BLOOD PRESSURE: 129 MMHG | OXYGEN SATURATION: 98 % | HEART RATE: 72 BPM

## 2022-02-10 DIAGNOSIS — R11.2 NAUSEA VOMITING AND DIARRHEA: ICD-10-CM

## 2022-02-10 DIAGNOSIS — R19.7 NAUSEA VOMITING AND DIARRHEA: ICD-10-CM

## 2022-02-10 LAB
ALBUMIN UR-MCNC: 70 MG/DL
ANION GAP SERPL CALCULATED.3IONS-SCNC: 7 MMOL/L (ref 3–14)
APPEARANCE UR: ABNORMAL
BASOPHILS # BLD AUTO: 0 10E3/UL (ref 0–0.2)
BASOPHILS NFR BLD AUTO: 0 %
BILIRUB UR QL STRIP: NEGATIVE
BUN SERPL-MCNC: 12 MG/DL (ref 7–30)
CALCIUM SERPL-MCNC: 9.3 MG/DL (ref 8.5–10.1)
CHLORIDE BLD-SCNC: 109 MMOL/L (ref 94–109)
CO2 SERPL-SCNC: 20 MMOL/L (ref 20–32)
COLOR UR AUTO: YELLOW
CREAT SERPL-MCNC: 0.68 MG/DL (ref 0.52–1.04)
CRP SERPL-MCNC: 4.5 MG/L (ref 0–8)
EOSINOPHIL # BLD AUTO: 0.1 10E3/UL (ref 0–0.7)
EOSINOPHIL NFR BLD AUTO: 0 %
ERYTHROCYTE [DISTWIDTH] IN BLOOD BY AUTOMATED COUNT: 13.1 % (ref 10–15)
FLUAV RNA SPEC QL NAA+PROBE: NEGATIVE
FLUBV RNA RESP QL NAA+PROBE: NEGATIVE
GFR SERPL CREATININE-BSD FRML MDRD: >90 ML/MIN/1.73M2
GLUCOSE BLD-MCNC: 150 MG/DL (ref 70–99)
GLUCOSE UR STRIP-MCNC: NEGATIVE MG/DL
HCG UR QL: NEGATIVE
HCT VFR BLD AUTO: 49 % (ref 35–47)
HGB BLD-MCNC: 16 G/DL (ref 11.7–15.7)
HGB UR QL STRIP: NEGATIVE
HYALINE CASTS: 39 /LPF
IMM GRANULOCYTES # BLD: 0.1 10E3/UL
IMM GRANULOCYTES NFR BLD: 0 %
KETONES UR STRIP-MCNC: ABNORMAL MG/DL
LEUKOCYTE ESTERASE UR QL STRIP: ABNORMAL
LYMPHOCYTES # BLD AUTO: 0.4 10E3/UL (ref 0.8–5.3)
LYMPHOCYTES NFR BLD AUTO: 3 %
MCH RBC QN AUTO: 30.4 PG (ref 26.5–33)
MCHC RBC AUTO-ENTMCNC: 32.7 G/DL (ref 31.5–36.5)
MCV RBC AUTO: 93 FL (ref 78–100)
MONOCYTES # BLD AUTO: 0.5 10E3/UL (ref 0–1.3)
MONOCYTES NFR BLD AUTO: 4 %
MUCOUS THREADS #/AREA URNS LPF: PRESENT /LPF
NEUTROPHILS # BLD AUTO: 12.7 10E3/UL (ref 1.6–8.3)
NEUTROPHILS NFR BLD AUTO: 93 %
NITRATE UR QL: NEGATIVE
NRBC # BLD AUTO: 0 10E3/UL
NRBC BLD AUTO-RTO: 0 /100
PH UR STRIP: 6 [PH] (ref 4.7–8)
PLATELET # BLD AUTO: 403 10E3/UL (ref 150–450)
POTASSIUM BLD-SCNC: 3.6 MMOL/L (ref 3.4–5.3)
RBC # BLD AUTO: 5.27 10E6/UL (ref 3.8–5.2)
RBC URINE: 2 /HPF
RSV RNA SPEC NAA+PROBE: NEGATIVE
SARS-COV-2 RNA RESP QL NAA+PROBE: NEGATIVE
SODIUM SERPL-SCNC: 136 MMOL/L (ref 133–144)
SP GR UR STRIP: 1.03 (ref 1–1.03)
SQUAMOUS EPITHELIAL: 15 /HPF
UROBILINOGEN UR STRIP-MCNC: NORMAL MG/DL
WBC # BLD AUTO: 13.8 10E3/UL (ref 4–11)
WBC URINE: 5 /HPF

## 2022-02-10 PROCEDURE — C9803 HOPD COVID-19 SPEC COLLECT: HCPCS

## 2022-02-10 PROCEDURE — 85025 COMPLETE CBC W/AUTO DIFF WBC: CPT | Performed by: NURSE PRACTITIONER

## 2022-02-10 PROCEDURE — 99284 EMERGENCY DEPT VISIT MOD MDM: CPT | Mod: 25

## 2022-02-10 PROCEDURE — 81025 URINE PREGNANCY TEST: CPT | Performed by: STUDENT IN AN ORGANIZED HEALTH CARE EDUCATION/TRAINING PROGRAM

## 2022-02-10 PROCEDURE — 96372 THER/PROPH/DIAG INJ SC/IM: CPT | Performed by: NURSE PRACTITIONER

## 2022-02-10 PROCEDURE — 80048 BASIC METABOLIC PNL TOTAL CA: CPT | Performed by: NURSE PRACTITIONER

## 2022-02-10 PROCEDURE — 250N000011 HC RX IP 250 OP 636: Performed by: NURSE PRACTITIONER

## 2022-02-10 PROCEDURE — 87637 SARSCOV2&INF A&B&RSV AMP PRB: CPT | Performed by: NURSE PRACTITIONER

## 2022-02-10 PROCEDURE — 99284 EMERGENCY DEPT VISIT MOD MDM: CPT | Performed by: NURSE PRACTITIONER

## 2022-02-10 PROCEDURE — 36415 COLL VENOUS BLD VENIPUNCTURE: CPT | Performed by: NURSE PRACTITIONER

## 2022-02-10 PROCEDURE — 86140 C-REACTIVE PROTEIN: CPT | Performed by: NURSE PRACTITIONER

## 2022-02-10 PROCEDURE — 81001 URINALYSIS AUTO W/SCOPE: CPT | Performed by: NURSE PRACTITIONER

## 2022-02-10 RX ORDER — ONDANSETRON 4 MG/1
4 TABLET, ORALLY DISINTEGRATING ORAL ONCE
Status: COMPLETED | OUTPATIENT
Start: 2022-02-10 | End: 2022-02-10

## 2022-02-10 RX ORDER — METOCLOPRAMIDE HYDROCHLORIDE 5 MG/ML
10 INJECTION INTRAMUSCULAR; INTRAVENOUS ONCE
Status: COMPLETED | OUTPATIENT
Start: 2022-02-10 | End: 2022-02-10

## 2022-02-10 RX ORDER — ONDANSETRON 4 MG/1
4 TABLET, ORALLY DISINTEGRATING ORAL EVERY 6 HOURS PRN
Qty: 10 TABLET | Refills: 0 | Status: SHIPPED | OUTPATIENT
Start: 2022-02-10 | End: 2022-02-13

## 2022-02-10 RX ADMIN — METOCLOPRAMIDE HYDROCHLORIDE 10 MG: 5 INJECTION INTRAMUSCULAR; INTRAVENOUS at 22:04

## 2022-02-10 RX ADMIN — ONDANSETRON 4 MG: 4 TABLET, ORALLY DISINTEGRATING ORAL at 20:36

## 2022-02-10 ASSESSMENT — ENCOUNTER SYMPTOMS
CARDIOVASCULAR NEGATIVE: 1
NEUROLOGICAL NEGATIVE: 1
HEMATOLOGIC/LYMPHATIC NEGATIVE: 1
ALLERGIC/IMMUNOLOGIC NEGATIVE: 1
ENDOCRINE NEGATIVE: 1
VOMITING: 1
ABDOMINAL PAIN: 1
CONSTITUTIONAL NEGATIVE: 1
DIARRHEA: 1
RESPIRATORY NEGATIVE: 1
EYES NEGATIVE: 1
PSYCHIATRIC NEGATIVE: 1
MUSCULOSKELETAL NEGATIVE: 1
NAUSEA: 1

## 2022-02-11 NOTE — DISCHARGE INSTRUCTIONS
Thank you for choosing [facility] for your healthcare needs today.  For your nausea vomiting and diarrhea, continue to push fluids, take your antinausea medication every 6 hours as needed, rest your stomach over the next 24 hours and only use fluids.  If you eat, eat mild bland foods that will not upset your stomach.  If your symptoms worsen or you develop any new concerning symptoms please return to ER.  Please follow-up as needed with your primary care provider next week.  Thank you

## 2022-02-11 NOTE — ED PROVIDER NOTES
History     Chief Complaint   Patient presents with     Abdominal Pain     c/o abd pain and vomiting for the past 2-3 hrs. notes shortness of breath. sats 96% in triage. notes boyfriend has same symptoms and tested negative for covid yesterday.      HPI   History of presenting illness given by patient.    Jacquelin De Leon is a 31 year old female who presents with nausea, vomiting, and diarrhea that started today around 4 PM.  Around 4 PM she suddenly developed nausea started drinking Powerade, continued to feel nauseous, went to lay down, woke up running to the bathroom and vomited.  She laid back down, continued to have nausea, stomach felt ill and then started cramping, she got up went to the bathroom vomited and then proceeded to have diarrhea along with vomiting.  She continues to vomit and have nausea.  Her boyfriend recently tested for Covid.  She has a fever of 101.  She has hot and cold flashes.  She denies any urinary burning, frequency, or urgency.  Her significant other that she lives with had these exact same symptoms yesterday.    Allergies:  No Known Allergies    Problem List:    There are no problems to display for this patient.       Past Medical History:    No past medical history on file.    Past Surgical History:    No past surgical history on file.    Family History:    No family history on file.    Social History:  Marital Status:  Single [1]  Social History     Tobacco Use     Smoking status: Former Smoker     Types: Cigarettes     Smokeless tobacco: Never Used   Substance Use Topics     Alcohol use: Not Currently     Comment: rarely     Drug use: Never        Medications:    ondansetron (ZOFRAN ODT) 4 MG ODT tab          Review of Systems   Constitutional: Negative.    HENT: Negative.    Eyes: Negative.    Respiratory: Negative.    Cardiovascular: Negative.    Gastrointestinal: Positive for abdominal pain, diarrhea, nausea and vomiting.   Endocrine: Negative.    Genitourinary: Negative.     Musculoskeletal: Negative.    Skin: Negative.    Allergic/Immunologic: Negative.    Neurological: Negative.    Hematological: Negative.    Psychiatric/Behavioral: Negative.        Physical Exam   BP: 122/83  Pulse: 68  Temp: 100.1  F (37.8  C)  Resp: 18  SpO2: 96 %      Physical Exam  Vitals and nursing note reviewed.   Constitutional:       Appearance: She is well-developed and normal weight.   HENT:      Head: Normocephalic and atraumatic.      Mouth/Throat:      Mouth: Mucous membranes are moist.      Pharynx: Oropharynx is clear.   Eyes:      Extraocular Movements: Extraocular movements intact.      Pupils: Pupils are equal, round, and reactive to light.   Cardiovascular:      Rate and Rhythm: Normal rate and regular rhythm.      Heart sounds: Normal heart sounds.   Pulmonary:      Effort: Pulmonary effort is normal.      Breath sounds: Normal breath sounds.   Abdominal:      General: Abdomen is flat. Bowel sounds are increased.      Palpations: Abdomen is soft.      Tenderness: There is generalized abdominal tenderness.   Skin:     General: Skin is warm and dry.   Neurological:      General: No focal deficit present.      Mental Status: She is alert and oriented to person, place, and time.   Psychiatric:         Mood and Affect: Mood normal.         Behavior: Behavior normal.         ED Course                       Results for orders placed or performed during the hospital encounter of 02/10/22 (from the past 24 hour(s))   Symptomatic; Unknown Influenza A/B & SARS-CoV2 (COVID-19) Virus PCR Multiplex Nasopharyngeal    Specimen: Nasopharyngeal; Swab   Result Value Ref Range    Influenza A PCR Negative Negative    Influenza B PCR Negative Negative    RSV PCR Negative Negative    SARS CoV2 PCR Negative Negative    Narrative    Testing was performed using the Xpert Xpress CoV2/Flu/RSV Assay on the Cepheid GeneXpert Instrument. This test should be ordered for the detection of SARS-CoV-2 and influenza viruses in  individuals who meet clinical and/or epidemiological criteria. Test performance is unknown in asymptomatic patients. This test is for in vitro diagnostic use under the FDA EUA for laboratories certified under CLIA to perform high or moderate complexity testing. This test has not been FDA cleared or approved. A negative result does not rule out the presence of PCR inhibitors in the specimen or target RNA in concentration below the limit of detection for the assay. If only one viral target is positive but coinfection with multiple targets is suspected, the sample should be re-tested with another FDA cleared, approved, or authorized test, if coinfection would change clinical management. This test was validated by the [facility]. These laboratories are certified under the Clinical  Laboratory Improvement Amendments of 1988 (CLIA-88) as qualified to perform high complexity laboratory testing.   CBC with platelets differential    Narrative    The following orders were created for panel order CBC with platelets differential.  Procedure                               Abnormality         Status                     ---------                               -----------         ------                     CBC with platelets and d...[[id number]]  Abnormal            Final result                 Please view results for these tests on the individual orders.   Basic metabolic panel   Result Value Ref Range    Sodium 136 133 - 144 mmol/L    Potassium 3.6 3.4 - 5.3 mmol/L    Chloride 109 94 - 109 mmol/L    Carbon Dioxide (CO2) 20 20 - 32 mmol/L    Anion Gap 7 3 - 14 mmol/L    Urea Nitrogen 12 7 - 30 mg/dL    Creatinine 0.68 0.52 - 1.04 mg/dL    Calcium 9.3 8.5 - 10.1 mg/dL    Glucose 150 (H) 70 - 99 mg/dL    GFR Estimate >90 >60 mL/min/1.73m2   CRP inflammation   Result Value Ref Range    CRP Inflammation 4.5 0.0 - 8.0 mg/L   CBC with platelets and differential   Result Value Ref Range    WBC Count 13.8 (H) 4.0 - 11.0  10e3/uL    RBC Count 5.27 (H) 3.80 - 5.20 10e6/uL    Hemoglobin 16.0 (H) 11.7 - 15.7 g/dL    Hematocrit 49.0 (H) 35.0 - 47.0 %    MCV 93 78 - 100 fL    MCH 30.4 26.5 - 33.0 pg    MCHC 32.7 31.5 - 36.5 g/dL    RDW 13.1 10.0 - 15.0 %    Platelet Count 403 150 - 450 10e3/uL    % Neutrophils 93 %    % Lymphocytes 3 %    % Monocytes 4 %    % Eosinophils 0 %    % Basophils 0 %    % Immature Granulocytes 0 %    NRBCs per 100 WBC 0 <1 /100    Absolute Neutrophils 12.7 (H) 1.6 - 8.3 10e3/uL    Absolute Lymphocytes 0.4 (L) 0.8 - 5.3 10e3/uL    Absolute Monocytes 0.5 0.0 - 1.3 10e3/uL    Absolute Eosinophils 0.1 0.0 - 0.7 10e3/uL    Absolute Basophils 0.0 0.0 - 0.2 10e3/uL    Absolute Immature Granulocytes 0.1 <=0.4 10e3/uL    Absolute NRBCs 0.0 10e3/uL   UA with Microscopic reflex to Culture    Specimen: Urine, Clean Catch   Result Value Ref Range    Color Urine Yellow Colorless, Straw, Light Yellow, Yellow    Appearance Urine Slightly Cloudy (A) Clear    Glucose Urine Negative Negative mg/dL    Bilirubin Urine Negative Negative    Ketones Urine Trace (A) Negative mg/dL    Specific Gravity Urine 1.033 1.003 - 1.035    Blood Urine Negative Negative    pH Urine 6.0 4.7 - 8.0    Protein Albumin Urine 70  (A) Negative mg/dL    Urobilinogen Urine Normal Normal, 2.0 mg/dL    Nitrite Urine Negative Negative    Leukocyte Esterase Urine Moderate (A) Negative    Mucus Urine Present (A) None Seen /LPF    RBC Urine 2 <=2 /HPF    WBC Urine 5 <=5 /HPF    Squamous Epithelials Urine 15 (H) <=1 /HPF    Hyaline Casts Urine 39 (H) <=2 /LPF   HCG qualitative urine   Result Value Ref Range    hCG Urine Qualitative Negative Negative       Medications   ondansetron (ZOFRAN-ODT) ODT tab 4 mg (4 mg Oral Given 2/10/22 2036)   metoclopramide (REGLAN) injection 10 mg (10 mg Intramuscular Given 2/10/22 2204)       Assessments & Plan (with Medical Decision Making)   Jacquelin presents with nausea, vomiting, and diarrhea that started today around 4 PM.   Around 4 PM she suddenly developed nausea started drinking Powerade, continued to feel nauseous, went to lay down, woke up running to the bathroom and vomited.  She laid back down, continued to have nausea, stomach felt ill and then started cramping, she got up went to the bathroom vomited and then proceeded to have diarrhea along with vomiting.     On assessment of abdomen, she has generalized tenderness with palpation, hyperactive bowel sounds.  Negative flank pain.  Lungs are clear throughout.  Labs are are stable and reassuring.  Urine has greater than 15 squames contaminated specimen.  The CBC white count of 13.8 is most likely due to vomiting, and diarrhea.  As her boyfriend had the exact same symptoms yesterday and they live together I suspect this is a gastric viral infection.  After Zofran 4 mg ODT patient was able to drink 2 500 cc of fluid and keep it down.  She continues to have nausea, Reglan 10 mg IM given.  IV fluids not given as there is a nationwide shortage of fluids.    I discussed all labs with patient and that this is most likely related to gastric viral infection the same thing her boyfriend has.  I will discharge her with Zofran 4 mg ODT, patient will continue to increase fluids.  Patient has been instructed if she develops worsening symptoms or new concerning symptoms to return to ER.    I have reviewed the nursing notes.    I have reviewed the findings, diagnosis, plan and need for follow up with the patient.      New Prescriptions    ONDANSETRON (ZOFRAN ODT) 4 MG ODT TAB    Take 1 tablet (4 mg) by mouth every 6 hours as needed for nausea       Final diagnoses:   Nausea vomiting and diarrhea       2/10/2022   [facility]     Maira Smyth APRN CNP  02/10/22 3857

## 2022-11-21 ENCOUNTER — HOSPITAL ENCOUNTER (EMERGENCY)
Facility: HOSPITAL | Age: 32
Discharge: HOME | End: 2022-11-22
Attending: PHYSICIAN ASSISTANT | Admitting: STUDENT IN AN ORGANIZED HEALTH CARE EDUCATION/TRAINING PROGRAM
Payer: COMMERCIAL

## 2022-11-21 ENCOUNTER — APPOINTMENT (OUTPATIENT)
Dept: GENERAL RADIOLOGY | Facility: HOSPITAL | Age: 32
End: 2022-11-21
Attending: PHYSICIAN ASSISTANT
Payer: COMMERCIAL

## 2022-11-21 DIAGNOSIS — S61.512A LACERATION OF LEFT WRIST, INITIAL ENCOUNTER: ICD-10-CM
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
## 2022-11-21 DIAGNOSIS — S61.519A WRIST LACERATION: ICD-10-CM

## 2022-11-21 PROCEDURE — 99285 EMERGENCY DEPT VISIT HI MDM: CPT | Performed by: PHYSICIAN ASSISTANT

## 2022-11-21 PROCEDURE — 73090 X-RAY EXAM OF FOREARM: CPT | Mod: LT

## 2022-11-21 PROCEDURE — 99285 EMERGENCY DEPT VISIT HI MDM: CPT

## 2022-11-21 RX ORDER — LIDOCAINE HYDROCHLORIDE AND EPINEPHRINE BITARTRATE 20; .01 MG/ML; MG/ML
10 INJECTION, SOLUTION SUBCUTANEOUS ONCE
Status: DISCONTINUED | OUTPATIENT
Start: 2022-11-21 | End: 2022-11-22 | Stop reason: HOSPADM

## 2022-11-21 RX ORDER — DEXTROAMPHETAMINE SACCHARATE, AMPHETAMINE ASPARTATE, DEXTROAMPHETAMINE SULFATE AND AMPHETAMINE SULFATE 2.5; 2.5; 2.5; 2.5 MG/1; MG/1; MG/1; MG/1
10 TABLET ORAL
COMMUNITY
End: 2023-01-08

## 2022-11-21 RX ORDER — DEXTROAMPHETAMINE SACCHARATE, AMPHETAMINE ASPARTATE MONOHYDRATE, DEXTROAMPHETAMINE SULFATE AND AMPHETAMINE SULFATE 5; 5; 5; 5 MG/1; MG/1; MG/1; MG/1
20 CAPSULE, EXTENDED RELEASE ORAL EVERY MORNING
COMMUNITY

## 2022-11-21 ASSESSMENT — ACTIVITIES OF DAILY LIVING (ADL)
ADLS_ACUITY_SCORE: 35
ADLS_ACUITY_SCORE: 35

## 2022-11-21 ASSESSMENT — COLUMBIA-SUICIDE SEVERITY RATING SCALE - C-SSRS
ATTEMPT LIFETIME: NO
6. HAVE YOU EVER DONE ANYTHING, STARTED TO DO ANYTHING, OR PREPARED TO DO ANYTHING TO END YOUR LIFE?: NO
2. HAVE YOU ACTUALLY HAD ANY THOUGHTS OF KILLING YOURSELF?: NO
TOTAL  NUMBER OF ABORTED OR SELF INTERRUPTED ATTEMPTS LIFETIME: NO
TOTAL  NUMBER OF INTERRUPTED ATTEMPTS LIFETIME: NO
1. HAVE YOU WISHED YOU WERE DEAD OR WISHED YOU COULD GO TO SLEEP AND NOT WAKE UP?: NO

## 2022-11-21 NOTE — Clinical Note
Jacquelin De Leon was seen and treated in our emergency department on 11/21/2022.  She may return to work on 11/23/2022.       If you have any questions or concerns, please don't hesitate to call.      Bigg Barajas PA-C

## 2022-11-22 ENCOUNTER — TELEPHONE (OUTPATIENT)
Dept: BEHAVIORAL HEALTH | Facility: CLINIC | Age: 32
End: 2022-11-22

## 2022-11-22 VITALS
DIASTOLIC BLOOD PRESSURE: 73 MMHG | HEART RATE: 86 BPM | OXYGEN SATURATION: 99 % | RESPIRATION RATE: 16 BRPM | TEMPERATURE: 98.6 F | SYSTOLIC BLOOD PRESSURE: 110 MMHG

## 2022-11-22 RX ORDER — COPPER 313.4 MG/1
1 INTRAUTERINE DEVICE INTRAUTERINE CONTINUOUS
COMMUNITY

## 2022-11-22 RX ORDER — METFORMIN HCL 500 MG
1 TABLET, EXTENDED RELEASE 24 HR ORAL
COMMUNITY
Start: 2022-05-10

## 2022-11-22 RX ORDER — NICOTINE POLACRILEX 4 MG/1
1 GUM, CHEWING ORAL PRN
COMMUNITY
Start: 2022-04-19 | End: 2023-01-08

## 2022-11-22 RX ORDER — FLUTICASONE PROPIONATE 50 MCG
2 SPRAY, SUSPENSION (ML) NASAL DAILY
COMMUNITY
Start: 2022-05-10

## 2022-11-22 RX ORDER — SPIRONOLACTONE 50 MG/1
50 TABLET, FILM COATED ORAL 2 TIMES DAILY
COMMUNITY
Start: 2022-07-12

## 2022-11-22 ASSESSMENT — COLUMBIA-SUICIDE SEVERITY RATING SCALE - C-SSRS
2. HAVE YOU ACTUALLY HAD ANY THOUGHTS OF KILLING YOURSELF?: NO
2. HAVE YOU ACTUALLY HAD ANY THOUGHTS OF KILLING YOURSELF?: NO
6. HAVE YOU EVER DONE ANYTHING, STARTED TO DO ANYTHING, OR PREPARED TO DO ANYTHING TO END YOUR LIFE?: NO
TOTAL  NUMBER OF INTERRUPTED ATTEMPTS SINCE LAST CONTACT: NO
ATTEMPT SINCE LAST CONTACT: NO
SUICIDE, SINCE LAST CONTACT: NO
TOTAL  NUMBER OF ABORTED OR SELF INTERRUPTED ATTEMPTS LIFETIME: NO
1. SINCE LAST CONTACT, HAVE YOU WISHED YOU WERE DEAD OR WISHED YOU COULD GO TO SLEEP AND NOT WAKE UP?: NO
ATTEMPT LIFETIME: NO
1. HAVE YOU WISHED YOU WERE DEAD OR WISHED YOU COULD GO TO SLEEP AND NOT WAKE UP?: NO
TOTAL  NUMBER OF ABORTED OR SELF INTERRUPTED ATTEMPTS SINCE LAST CONTACT: NO
6. HAVE YOU EVER DONE ANYTHING, STARTED TO DO ANYTHING, OR PREPARED TO DO ANYTHING TO END YOUR LIFE?: NO
TOTAL  NUMBER OF INTERRUPTED ATTEMPTS LIFETIME: NO

## 2022-11-22 ASSESSMENT — ACTIVITIES OF DAILY LIVING (ADL)
ADLS_ACUITY_SCORE: 35

## 2022-11-22 NOTE — CARE PLAN
Jacquelin De Leon  November 22, 2022  Plan of Care Hand-off Note     Patient Care Path: Discharge    Plan for Care:     Discharge. Pt may need a cab. Pt reports having OP MH supports and is denying SI. See note for more info as needed..     Critical Safety Issues: SIB last night along with alcohol use.    Overview:  This patient is a child/adolescent: No    This patient has additional special visitor precautions: No    Legal Status: Voluntary    Contacts:   [place] - Emergency Contact as noted in demos    Psychiatry Consult:  Psychiatry Consult not requested because discharge    Updated Attending Provider regarding plan of care.    Emily Florentino, SULEMASW

## 2022-11-22 NOTE — ED TRIAGE NOTES
"Pt comes to ED triage with SO holding LEFT wrist with paper towel. SO states she cut herself with something. S.O. states she woke him up and there \"was blood everywhere in the kitchen\"  Pt allowed me to look at Lac and apply sterile dressing, bleeding minimal and controlled, pressure dressing applied for time being.   Pt is crying, withdrawn, not talking to us or answering questions. Will not tell me what she cut herself with or if it was intentional or accidental.   S.O went to move his car and pt requested he stays out, when he returned I Asked S.O to wait outside, S.O was understanding and pleasant about it. pt is more cooperative, denies abuse, but remains elusive about laceration. Pt Also has more SIB appearance type abrasions to LT wrist around main lac.  Pt roomed immediately for safety and MD and charge RN made aware.      Triage Assessment       Row Name 11/21/22 1946       Triage Assessment (Adult)    Airway WDL WDL       Skin Circulation/Temperature WDL    Skin Circulation/Temperature WDL X       Cognitive/Neuro/Behavioral WDL    Cognitive/Neuro/Behavioral WDL X;mood/behavior    Level of Consciousness alert    Mood/Behavior anxious;sad;uncooperative       Christin Coma Scale    Best Eye Response 4-->(E4) spontaneous    Best Motor Response 6-->(M6) obeys commands    Best Verbal Response 5-->(V5) oriented    Thurmond Coma Scale Score 15                  "

## 2022-11-22 NOTE — ED NOTES
Patient resting at this time. Respirations easy and unlabored.  Will monitor patient for changes to status

## 2022-11-22 NOTE — ED NOTES
DEC called back to re-assess patient after interviewing significant other.   Patient on I-Pad with DEC

## 2022-11-22 NOTE — ED NOTES
Patient resting at this time in room.  1:1 monitoring patient and environment.  Will monitor patient for changes to status.
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
## 2022-11-22 NOTE — ED NOTES
"Patient aroused easily by knocking on the door.   Patient agrees to have vitals taken. A&O x4.    No thoughts of harming herself at this time.  She stated \"never again\".  Reassure the patient that ED staff is here to help with any needs that she may have, patient is very thankful.    Currently waiting on 2nd DEC assessment.  Declined breakfast when offered at this time.        "

## 2022-11-22 NOTE — ED NOTES
Patient given her discharge instructions.   Verbalizes understanding, no questions at this time.   Patient encouraged to return to the ED if SI returns.   Self ambulated out of ED to wait for ride.

## 2022-11-22 NOTE — ED NOTES
Patient resting at this time.  Respirations easy and unlabored.  Will monitor patient for changes to status.

## 2022-11-22 NOTE — DISCHARGE INSTRUCTIONS
Aftercare Plan  Jacquelin - thanks for talking to us. Follow up plan below:    PCP visit today - 11/22/2022  2:20 PM  Soo Alvarado MD  [address]  [place] [address]  If you need a note re: ongoing accommodations at work re: arm injury - suggest talking to PCP.     Therapy appointment tomorrow.    Coping Skills/Safety Plan:  No alcohol for a while. If you find this hard to stick to - talk to your therapist/get more support. It's just about what works best for you right now during times of stress. Long term you can figure out later.  Narrating your feelings is an awesome skill. It helps us practice being kind to ourselves. Bonus is that it can teach our kids cool stuff too.   Reduce access to sharps - especially if you have been drinking.  No alcohol in home. Hopefully your partner can support you in this too to start.  If you need more supports for this challenging time - they're there. Call P ([phone]), ask your doctor, or call crisis ([phone]) - there are options for psychiatry, getting more intensive therapy, or help with alcohol use. It's on your terms.      If I am feeling unsafe or I am in a crisis, I will:   Contact my established care providers   Call the National Suicide Prevention Lifeline: 988. Or local mobile crisis team through [facility]:  [phone]  Go to the nearest emergency room   Call 911         Crisis Lines  Crisis Text Line  Text 307145  You will be connected with a trained live crisis counselor to provide support.      Community Resources  Fast Tracker  Linking people to mental health and substance use disorder resources  fasttrackermn.org       Additional Information  Today you were seen by a licensed mental health professional through Triage and Transition services, [facility])  for a crisis assessment in the Emergency Department at [facility].  It is recommended that you follow up with your established providers  (psychiatrist, mental health therapist, and/or primary care doctor - as relevant) as soon as possible. Coordinators from [facility] will be calling you in the next 24-48 hours to ensure that you have the resources you need.  You can also contact [facility] coordinators directly at [phone]. You may have been scheduled for or offered an appointment with a mental health provider. [facility] maintains an extensive network of licensed behavioral health providers to connect patients with the services they need.  We do not charge providers a fee to participate in our referral network.  We match patients with providers based on a patient's specific needs, insurance coverage, and location.  Our first effort will be to refer you to a provider within your care system, and will utilize providers outside your care system as needed.
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
## 2022-11-22 NOTE — TELEPHONE ENCOUNTER
8:23a Intake received call from [facility] ([facility]) requesting Intake remove pt from queue for IP MH bed placement at [facility] due to the pt discharging.

## 2022-11-22 NOTE — CONSULTS
[facility] Crisis Reassessment      Jacquelin De Leon was reassessed at the request of ED for the following reasons: sober, calmer, more able to discuss concerns. Pt was first seen on 11/21 by Fouzia Epperson; see the initial assessment note for details.      Patient Presentation    Initial ED presentation details: guarded, minimizing, possibly under the influence of alcohol.    Current patient presentation: more open, agreeable, calm, oriented, some insight. She reports cutting last night d/t stress from family conflict (stepdad and mom , mom's heart attack - this is just like when her parents split when she was younger  + conflict with mom over her own son and mom custody at times).    Reports drinking some days, couple of drinks. Had like 4 large seltzers last night. Denies alcohol concerns. Reports she will not drink now. Reports she talked to boyfriend this morning and he removed alcohol and large sharps from apartment. Is regretting behavior last night. Reports she is working with therapist weekly, and having meds now (adderall shortage) helps too. Coping skills of narrating her feelings helps. Recalls appt coming up with PCP today she plans to attend, therapy reported tomorrow.    Changes observed since initial assessment: much calmer, more cooperative. BAL not obtained initially but pt did report drinking several drinks last night and feels sober now per her report.      Risk of Harm    [place] Suicide Severity Rating Scale Full Clinical Version:completed yesterday (by my colleague) and today (by me) - repeated in case pt had anything additional to report/corrections. She did not.  Suicidal Ideation  1. Wish to be Dead (Lifetime): No  2. Non-Specific Active Suicidal Thoughts (Lifetime): No     Suicidal Behavior  Actual Attempt (Lifetime): No  Has subject engaged in non-suicidal self-injurious behavior? (Lifetime): Yes  Has subject engaged in non-suicidal self-injurious behavior? (Past 3 Months):  Yes  Interrupted Attempts (Lifetime): No  Aborted or Self-Interrupted Attempt (Lifetime): No  Preparatory Acts or Behavior (Lifetime): No  C-SSRS Risk (Lifetime/Recent)  Calculated C-SSRS Risk Score (Lifetime/Recent): No Risk Indicated    [place] Suicide Severity Rating Scale Since Last Contact: completed today by me  Suicidal Ideation (Since Last Contact)  1. Wish to be Dead (Since Last Contact): No  2. Non-Specific Active Suicidal Thoughts (Since Last Contact): No  Suicidal Behavior (Since Last Contact)  Actual Attempt (Since Last Contact): No  Has subject engaged in non-suicidal self-injurious behavior? (Since Last Contact): No  Interrupted Attempts (Since Last Contact): No  Aborted or Self-Interrupted Attempt (Since Last Contact): No  Preparatory Acts or Behavior (Since Last Contact): No  Suicide (Since Last Contact): No     C-SSRS Risk (Since Last Contact)  Calculated C-SSRS Risk Score (Since Last Contact): No Risk Indicated    Validity of evaluation is impacted by presenting factors during interview was initially quite guarded. Today appears more open   Comments regarding subjective versus objective responses to [place] tool: boyfriend last night reported concerns re: pt MH, self injury, stressors. Denies SI attempts at that time too.  Environmental or Psychosocial Events: legal issues such as DWI, DUI, lawsuit, CPS involvement, etc., challenging interpersonal relationships, impulsivity/recklessness and ongoing abuse of substances  Chronic Risk Factors: other: conflict with mother who has guardianship of her son   Warning Signs: seeking access to means to hurt or kill self, acting reckless or engaging in risky activities, anxiety, agitation, unable to sleep, sleeping all the time and dramatic changes in mood  Protective Factors: intact marriage or domestic partnership and lives in a responsibly safe and stable environment  Interpretation of Risk Scoring, Risk Mitigation Interventions and Safety Plan: pt  today identifies stressors that led to self injury last night. She also articulates her own risk mitigation plan - therapy, meds, coping skills. She is oriented and calm. She and I discuss other supports should she want/ need them. She appears ready for discharge.        Does the patient have access to lethal means? Has sharps at home but boyfriend is securing them.     Does the patient engage in non-suicidal self-injurious behavior (NSSI/SIB)? Denies except for last night. MD did note some scratches on  Pt too - unclear when they occurred.     Does the patient have thoughts of harming others? No    Mental Status Exam   Affect: Appropriate   Appearance: Appropriate    Attention Span/Concentration: Attentive?    Eye Contact: Engaged   Fund of Knowledge: Appropriate    Language /Speech Content: Fluent   Language /Speech Volume: Normal    Language /Speech Rate/Productions: Normal    Recent Memory: Intact   Remote Memory: Intact   Mood: Normal    Orientation to Person: Yes    Orientation to Place: Yes   Orientation to Time of Day: Yes    Orientation to Date: Yes    Situation (Do they understand why they are here?): Yes    Psychomotor Behavior: Normal    Thought Content: Clear   Thought Form: Intact       Additional Collateral Information   None - pt boyfriend talked to last night.      Therapeutic Intervention  The following therapeutic methodologies were employed when working with the patient: Establishing rapport, Active listening, Assess dimensions of crisis, Apply solution-focused therapy to address current crisis, Identify additional supports and alternative coping skills and Establish a discharge plan. Patient response to intervention: rates it as supportive.    Diagnosis:     296.23 (F32.2) Major Depressive Disorder, Single Episode, Severe _ and With anxious distress - primary   Attention-Deficit/Hyperactivity Disorder  314.01 (F90.2) Combined presentation - by history    Clinical Substantiation of  Recommendations  After therapeutic assessment, intervention and aftercare planning by ED care team and [facility] and in consultation with attending provider, the patient's circumstances and mental state were appropriate for outpatient management. It is the recommendation of this clinician that pt discharge with OP MH support. A this time the pt is not presenting as an acute risk to self or others due to the following factors: calmer, able to participate in aftercareplanning, denies SI..     Plan:    Disposition  Recommended disposition: Individual Therapy and Medication Management , consider dual tx/alcohol tx.      Reviewed case and recommendations with attending provider. Attending Name: Magno      Attending concurs with disposition: Yes      Patient concurs with disposition: Yes      Final disposition: Individual therapy  and Medication management.         Assessment Details  Total duration spent on the patient case in minutes: .75 hrs     CPT code(s) utilized: 28361 - Psychotherapy for Crisis (Each additional 30 minutes) - 30 min        SUDHEER Montano, [facility]  Callback: [phone]      Aftercare Plan  Jacquelin - thanks for talking to us. Follow up plan below:    PCP visit today - 11/22/2022  2:20 PM  Soo Alvarado MD  [address] [place], [place] [address]&&  [place] [address]  If you need a note re: ongoing accommodations at work re: arm injury - suggest talking to PCP.     Therapy appointment tomorrow.    Coping Skills/Safety Plan:    No alcohol for a while. If you find this hard to stick to - talk to your therapist/get more support. It's just about what works best for you right now during times of stress. Long term you can figure out later.    Narrating your feelings is an awesome skill. It helps us practice being kind to ourselves. Bonus is that it can teach our kids cool stuff too.     Reduce access to sharps - especially if you have been drinking.    No alcohol in home. Hopefully your partner can support you in this  too to start.  If you need more supports for this challenging time - they're there. Call [facility] ([phone]), ask your doctor, or call crisis ([phone]) - there are options for psychiatry, getting more intensive therapy, or help with alcohol use. It's on your terms.      If I am feeling unsafe or I am in a crisis, I will:   Contact my established care providers   Call the National Suicide Prevention Lifeline: 068. Or local mobile crisis team through [facility]:  [phone]  Go to the nearest emergency room   Call 911         Crisis Lines  Crisis Text Line  Text 690177  You will be connected with a trained live crisis counselor to provide support.      Community Resources  Fast Tracker  Linking people to mental health and substance use disorder resources  [employer]       Additional Information  Today you were seen by a licensed mental health professional through Triage and Transition services, [facility] ([facility])  for a crisis assessment in the Emergency Department at [facility].  It is recommended that you follow up with your established providers (psychiatrist, mental health therapist, and/or primary care doctor - as relevant) as soon as possible. Coordinators from [facility] will be calling you in the next 24-48 hours to ensure that you have the resources you need.  You can also contact [facility] coordinators directly at [phone]. You may have been scheduled for or offered an appointment with a mental health provider. [facility] maintains an extensive network of licensed behavioral health providers to connect patients with the services they need.  We do not charge providers a fee to participate in our referral network.  We match patients with providers based on a patient's specific needs, insurance coverage, and location.  Our first effort will be to refer you to a provider within your care system, and will utilize providers outside your care system as needed.

## 2022-11-22 NOTE — ED PROVIDER NOTES
History     Chief Complaint   Patient presents with     Laceration     HPI  Jacquelin De Leon is a 32 year old female who is to the ER for evaluation of a left wrist laceration.  Patient will not tell me how this occurred.  She will answer other questions except for how this occurred.  Patient notes it was with a kitchen knife but will give any other details.  She notes severe pain to the forearm with any movement of her hand.  She has difficulty opening and closing the hand secondary to pain.  She currently denies any numbness or tingling.  States her last tetanus was 2 years ago.  When asked if this was intentional she did shake her head no eventually the patient did admit to this being intentional.  She does admit to stabbing herself.  She will not give me any more information as to what went on or why she did it.  She does note she has multiple stressors in her life including family situation at home.  She admits to 1 beer denies any drug use she tells me that she does not have a past history of cutting    Able to talk to the boyfriend who is tells me that her and her mom have been having a lot of fighting over the last few weeks related to custody larose with her son.  They have also been drinking significantly to the point where the boyfriend removed alcohol from the house however she goes out buys it and hides it.  He is quite convinced that she is been drinking tonight.  He has significant concerns that this was an intentional harm to her self as well.  He is not safe with him at the house for now as he has a full-time job and cannot be at the house    Allergies:  No Known Allergies    Problem List:    Patient Active Problem List    Diagnosis Date Noted     Wrist laceration 11/21/2022     Priority: Medium     Suicide gesture, initial encounter (H) 11/21/2022     Priority: Medium        Past Medical History:    No past medical history on file.    Past Surgical History:    No past surgical history on  file.    Family History:    No family history on file.    Social History:  Marital Status:  Single [1]  Social History     Tobacco Use     Smoking status: Former     Types: Cigarettes     Smokeless tobacco: Never   Substance Use Topics     Alcohol use: Not Currently     Comment: rarely     Drug use: Never        Medications:    amphetamine-dextroamphetamine (ADDERALL XR) 20 MG 24 hr capsule  amphetamine-dextroamphetamine (ADDERALL) 10 MG tablet  FLUoxetine (PROZAC) 20 MG capsule  EQ NICOTINE POLACRILEX 4 MG gum  fluticasone (FLONASE) 50 MCG/ACT nasal spray  metFORMIN (GLUCOPHAGE XR) 500 MG 24 hr tablet  paragard intrauterine copper device  spironolactone (ALDACTONE) 50 MG tablet          Review of Systems   Unable to perform ROS: Other (Patient will not answer any other questions at this time)   All other systems reviewed and are negative.      Physical Exam   BP: 139/83  Pulse: 107  Temp: 98  F (36.7  C)  Resp: 26  SpO2: 96 %      Physical Exam  Constitutional:       General: She is not in acute distress.     Appearance: Normal appearance. She is normal weight. She is not ill-appearing, toxic-appearing or diaphoretic.   HENT:      Head: Normocephalic and atraumatic.      Right Ear: External ear normal.      Left Ear: External ear normal.   Eyes:      Extraocular Movements: Extraocular movements intact.      Conjunctiva/sclera: Conjunctivae normal.      Pupils: Pupils are equal, round, and reactive to light.      Comments: Glassy eyed   Cardiovascular:      Rate and Rhythm: Normal rate.   Pulmonary:      Effort: Pulmonary effort is normal. No respiratory distress.   Musculoskeletal:         General: Normal range of motion.   Skin:     General: Skin is warm and dry.      Coloration: Skin is not jaundiced or pale.   Neurological:      Mental Status: She is alert and oriented to person, place, and time. Mental status is at baseline.      GCS: GCS eye subscore is 4. GCS verbal subscore is 5. GCS motor subscore is 6.       Cranial Nerves: Cranial nerves 2-12 are intact. No cranial nerve deficit.   Psychiatric:         Mood and Affect: Mood is depressed.         Behavior: Behavior is withdrawn.      Comments: Patient is withdrawn at times she is tearful         ED Course   I have reviewed the epic chart, the nurses note and triage note. vital signs were reviewed.  Patient does appear to have alcohol on board just by her behavior.  Boyfriend believes that she has been drinking tonight.  I believe this is was an intentional effort at hurting herself tonight.  In terms of the laceration discussed wound care with the patient and performed wound closure.  Patient was agreeable to having me talk with her boyfriend and allowed me to discuss her information tonight.  DEC assessment is obtained.  Patient is placed on a 12-hour  hold.  Mental Health Risk Assessment      PSS-3    Date and Time Over the past 2 weeks have you felt down, depressed, or hopeless? Over the past 2 weeks have you had thoughts of killing yourself? Have you ever attempted to kill yourself? When did this last happen? User   11/22/22 0728 yes yes refused -- TK   11/21/22 2010 refused refused refused -- TS   11/21/22 1949 yes refused refused -- MKB      C-SSRS ([place])    Date and Time Q1 Wished to be Dead (Past Month) Q2 Suicidal Thoughts (Past Month) Q3 Suicidal Thought Method Q4 Suicidal Intent without Specific Plan Q5 Suicide Intent with Specific Plan Q6 Suicide Behavior (Lifetime) Within the Past 3 Months? RETIRED: Level of Risk per Screen Screening Not Complete User   11/22/22 0728 no yes no no no no -- -- -- TK          patient refuses labs    While in the department patient has mood swings from aggression irritation.    In my shift care signed out to Dr. Palacio.  ED Course as of 11/22/22 0805 Tue Nov 22, 2022   0804 Care turned over to me by Dr. De La O.  Patient reassessed by DEC who recommends discharge and outpatient follow up.  Patient will be  discharged with appropriate follow up instructions.  Jose Francisco Kiran     Procedures              Results for orders placed or performed during the hospital encounter of 11/21/22 (from the past 24 hour(s))   Radius/Ulna XR,  PA &LAT, left    Narrative    Exam: XR FOREARM LEFT 2 VIEWS     History:Female, age 32 years, fb eval, stabbed self with knife.    Comparison:  No relevant prior imaging.    Technique: Three views are submitted.    Findings: Bones are normally mineralized. No evidence of acute or  subacute fracture.  No evidence of dislocation.           Impression    Impression:  No evidence of acute or subacute bony abnormality.     No evidence of radiodense foreign body.    Mild soft tissue swelling in the mid/proximal forearm.    This report is in agreement with the preliminary report.    ASIF CARLSON MD         SYSTEM ID:  [id number]       Medications   lidocaine 2%-EPINEPHrine 1:100,000 2 %-1:[id number] injection 10 mL (has no administration in time range)       Assessments & Plan (with Medical Decision Making)     I have reviewed the nursing notes.    I have reviewed the findings, diagnosis, plan and need for follow up with the patient.  Reassessment by Dr. Palacio at 0500.  Patient now clinically sober.  Admits to cutting herself last night, denies any previous history of cutting.  Was profoundly disappointed with how things are now over the holidays, she had hoped that her mother would have a family Thanksgiving with her and her boyfriend at her house however mother has recently left as she does not get along with boyfriend.  Patient had approximately 4 Budweiser seltzers last night.  An attempt to feel something as she was feeling numb, she cut herself, did not need to stab her self as severe as she did.  He denies any previous hitting of cutting.  Has also been out of her Prozac and Adderall recently as her primary stopped working and her pharmacy was out of her medications.  Got them yesterday but did not take  them because it was later in the afternoon and wanted to be able to sleep.  At this time she denies suicidal ideation.  Denies that she wanted to hurt her self last night, again, just 1 to feel something.  He does not recall telling previous DEC writer about the dog biting or scratching her arm.    Dispo per DEC.  She seems reasonable at this time, my primary concern is the lack of history of cutting and then starting that in the context of being inebriated.  However, she does have her mental health meds again and has multiple appointments with mental health providers today.    New Prescriptions    No medications on file       Final diagnoses:   Suicide gesture, initial encounter (H)   Wrist laceration       11/21/2022   [facility]     Bigg Barajas PA-C  11/21/22 9964       Handy De La O MD  11/22/22 0536       Jeremiah Kiran DO  11/22/22 7820

## 2022-11-22 NOTE — ED NOTES
Patient attempting to elope.  Code 21 called but patient de-escalated verbally and redirected back to room.  Will monitor patient for changes to status.

## 2022-11-22 NOTE — ED NOTES
Pt noted to be putting her middle finger up to security.    Shahida Valencia RN on 11/21/2022 at 8:46 PM

## 2022-11-22 NOTE — ED NOTES
Called to patient bedside, patient requesting apple juice and water, fluids provided per patient request.  Patient resting comfortably at this time, offered bathroom and warm blanket which were declined.  Will monitor patient for changes to status

## 2022-11-22 NOTE — ED NOTES
Patient requesting to call significant other, phone provided.  Will monitor patient for changes to status.

## 2023-01-08 ENCOUNTER — HOSPITAL ENCOUNTER (EMERGENCY)
Facility: HOSPITAL | Age: 33
Discharge: HOME | End: 2023-01-08
Attending: EMERGENCY MEDICINE | Admitting: EMERGENCY MEDICINE
Payer: COMMERCIAL

## 2023-01-08 VITALS
OXYGEN SATURATION: 100 % | HEART RATE: 100 BPM | RESPIRATION RATE: 16 BRPM | TEMPERATURE: 97.9 F | DIASTOLIC BLOOD PRESSURE: 79 MMHG | SYSTOLIC BLOOD PRESSURE: 116 MMHG

## 2023-01-08 DIAGNOSIS — M54.2 NECK PAIN: ICD-10-CM

## 2023-01-08 DIAGNOSIS — G44.209 TENSION HEADACHE: ICD-10-CM

## 2023-01-08 LAB
FLUAV RNA SPEC QL NAA+PROBE: NEGATIVE
FLUBV RNA RESP QL NAA+PROBE: NEGATIVE
HCG UR QL: NEGATIVE
RSV RNA SPEC NAA+PROBE: NEGATIVE
SARS-COV-2 RNA RESP QL NAA+PROBE: NEGATIVE

## 2023-01-08 PROCEDURE — 250N000011 HC RX IP 250 OP 636: Performed by: EMERGENCY MEDICINE

## 2023-01-08 PROCEDURE — 99283 EMERGENCY DEPT VISIT LOW MDM: CPT | Mod: CS | Performed by: EMERGENCY MEDICINE

## 2023-01-08 PROCEDURE — C9803 HOPD COVID-19 SPEC COLLECT: HCPCS

## 2023-01-08 PROCEDURE — 96372 THER/PROPH/DIAG INJ SC/IM: CPT | Performed by: EMERGENCY MEDICINE

## 2023-01-08 PROCEDURE — 87637 SARSCOV2&INF A&B&RSV AMP PRB: CPT | Performed by: EMERGENCY MEDICINE

## 2023-01-08 PROCEDURE — 250N000013 HC RX MED GY IP 250 OP 250 PS 637: Performed by: EMERGENCY MEDICINE

## 2023-01-08 PROCEDURE — 99284 EMERGENCY DEPT VISIT MOD MDM: CPT | Mod: CS

## 2023-01-08 PROCEDURE — 81025 URINE PREGNANCY TEST: CPT | Performed by: EMERGENCY MEDICINE

## 2023-01-08 RX ORDER — METFORMIN HCL 500 MG
500 TABLET, EXTENDED RELEASE 24 HR ORAL
COMMUNITY
Start: 2022-12-20 | End: 2023-01-08

## 2023-01-08 RX ORDER — ATOMOXETINE 25 MG/1
25 CAPSULE ORAL
COMMUNITY
Start: 2022-12-20

## 2023-01-08 RX ORDER — BACLOFEN 5 MG/1
5 TABLET ORAL ONCE
Status: COMPLETED | OUTPATIENT
Start: 2023-01-08 | End: 2023-01-08

## 2023-01-08 RX ORDER — KETOROLAC TROMETHAMINE 15 MG/ML
15 INJECTION, SOLUTION INTRAMUSCULAR; INTRAVENOUS ONCE
Status: COMPLETED | OUTPATIENT
Start: 2023-01-08 | End: 2023-01-08

## 2023-01-08 RX ORDER — LIDOCAINE 4 G/G
1 PATCH TOPICAL ONCE
Status: DISCONTINUED | OUTPATIENT
Start: 2023-01-08 | End: 2023-01-08 | Stop reason: HOSPADM

## 2023-01-08 RX ORDER — FLUOXETINE 40 MG/1
CAPSULE ORAL
COMMUNITY
Start: 2022-12-21

## 2023-01-08 RX ADMIN — BACLOFEN 5 MG: 5 TABLET ORAL at 10:58

## 2023-01-08 RX ADMIN — LIDOCAINE 1 PATCH: 560 PATCH PERCUTANEOUS; TOPICAL; TRANSDERMAL at 09:35

## 2023-01-08 RX ADMIN — KETOROLAC TROMETHAMINE 15 MG: 15 INJECTION, SOLUTION INTRAMUSCULAR; INTRAVENOUS at 09:36

## 2023-01-08 ASSESSMENT — ACTIVITIES OF DAILY LIVING (ADL)
ADLS_ACUITY_SCORE: 33
ADLS_ACUITY_SCORE: 35

## 2023-01-08 NOTE — ED NOTES
Pt presents with neck pain/stiffness for the past 5 days. Pt denies injury to the neck. Pt has no pain over the spine on palpation. Pt states she has a contant pressure that changes in intensity at the base of her skull and throughout the occipital region. Pt states she has had nausea,  vomiting, dizziness, blurred vision on /off. Increased fatigue and no appetite. Pt reports low grade fever yesterday. Pt had negative home COVID yesterday. Pt denies SOB, chest pain, abdominal pain, and diarrhea. Pt denies migraine hx.  Pt has generalized weakness.

## 2023-01-08 NOTE — ED PROVIDER NOTES
History     Chief Complaint   Patient presents with     Neck Pain     Headache     HPI  Jacquelin De Leon is a 32 year old female who presents with headache. Reports headache and neck stiffness occipital area for the past week. Had low grade temp of 100 last night. Took aspirin this morning and tylenol last night without relief.  No cough, congestion. Mild sore throat. Feels generally weak, no focal symptoms. Took home covid test, negative. No nausea/vomiting. No chest pain or dyspnea.  No injuries or falls. May have slight blurred vision.     LMP: started 1/5    Allergies:  No Known Allergies    Problem List:    Patient Active Problem List    Diagnosis Date Noted     Wrist laceration 11/21/2022     Priority: Medium     Suicide gesture, initial encounter (H) 11/21/2022     Priority: Medium        Past Medical History:    History reviewed. No pertinent past medical history.    Past Surgical History:    History reviewed. No pertinent surgical history.    Family History:    No family history on file.    Social History:  Marital Status:  Single [1]  Social History     Tobacco Use     Smoking status: Former     Types: Cigarettes     Smokeless tobacco: Never   Substance Use Topics     Alcohol use: Not Currently     Comment: rarely     Drug use: Never        Medications:    amphetamine-dextroamphetamine (ADDERALL XR) 20 MG 24 hr capsule  aspirin (ASA) 325 MG EC tablet  atomoxetine (STRATTERA) 25 MG capsule  FLUoxetine (PROZAC) 40 MG capsule  fluticasone (FLONASE) 50 MCG/ACT nasal spray  metFORMIN (GLUCOPHAGE XR) 500 MG 24 hr tablet  paragard intrauterine copper device  spironolactone (ALDACTONE) 50 MG tablet          Review of Systems  Please seen HPI for pertinent positives and negatives. All other systems reviewed and found to be negative.   Physical Exam   BP: 135/84  Pulse: 102  Temp: 98.4  F (36.9  C)  Resp: 16  SpO2: 100 %      Physical Exam  Constitutional:       General: She is not in acute distress.      Appearance: She is not ill-appearing.   HENT:      Head: Normocephalic and atraumatic.      Nose: Nose normal.      Mouth/Throat:      Mouth: Mucous membranes are moist.      Pharynx: Oropharynx is clear.   Eyes:      Extraocular Movements: Extraocular movements intact.      Conjunctiva/sclera: Conjunctivae normal.      Pupils: Pupils are equal, round, and reactive to light.   Cardiovascular:      Rate and Rhythm: Normal rate and regular rhythm.   Pulmonary:      Effort: Pulmonary effort is normal.      Breath sounds: Normal breath sounds.   Abdominal:      Palpations: Abdomen is soft.      Tenderness: There is no abdominal tenderness.   Musculoskeletal:      Cervical back: Normal range of motion. No tenderness.      Comments: 5/5 strength upper and lower extremities  Has walking boot on L foot (reports decreased sensation L toes for several weeks, being worked up outpatient)   Skin:     General: Skin is warm and dry.   Neurological:      Mental Status: She is alert and oriented to person, place, and time.      Comments: Pupils equal, round and reactive to light and accommodation, extraoccular muscles intact without nystagmus, CN II-XII intact, strength and sensation to all extremities intact         ED Course              ED Course as of 01/08/23 1132   Sun Jan 08, 2023   1132 Patient had minimal improvement after Toradol but did improve after baclofen.  Discussed home cares, follow-up with primary care, physical therapy for persistent symptoms, gentle stretching.  Return precautions discussed as detailed in AVS. Patient expressed understanding.     Procedures              Critical Care time:  none               Results for orders placed or performed during the hospital encounter of 01/08/23 (from the past 24 hour(s))   Symptomatic Influenza A/B & SARS-CoV2 (COVID-19) Virus PCR Multiplex Nasopharyngeal    Specimen: Nasopharyngeal; Swab   Result Value Ref Range    Influenza A PCR Negative Negative    Influenza B PCR  Negative Negative    RSV PCR Negative Negative    SARS CoV2 PCR Negative Negative    Narrative    Testing was performed using the Xpert Xpress CoV2/Flu/RSV Assay on the [facility] GeneXpert Instrument. This test should be ordered for the detection of SARS-CoV-2 and influenza viruses in individuals who meet clinical and/or epidemiological criteria. Test performance is unknown in asymptomatic patients. This test is for in vitro diagnostic use under the FDA EUA for laboratories certified under CLIA to perform high or moderate complexity testing. This test has not been FDA cleared or approved. A negative result does not rule out the presence of PCR inhibitors in the specimen or target RNA in concentration below the limit of detection for the assay. If only one viral target is positive but coinfection with multiple targets is suspected, the sample should be re-tested with another FDA cleared, approved, or authorized test, if coinfection would change clinical management. This test was validated by the [facility]. These laboratories are certified under the Clinical Laboratory Improvement Amendments of 1988 (CLIA-88) as qualified to perform high complexity laboratory testing.   HCG qualitative urine   Result Value Ref Range    hCG Urine Qualitative Negative Negative       Medications   Lidocaine (LIDOCARE) 4 % Patch 1 patch (1 patch Transdermal Patch/Med Applied 1/8/23 9058)   ketorolac (TORADOL) injection 15 mg (15 mg Intramuscular Given 1/8/23 0936)   Baclofen (LIORESAL) tablet 5 mg (5 mg Oral Given 1/8/23 4208)       Assessments & Plan (with Medical Decision Making)     I have reviewed the nursing notes.    I have reviewed the findings, diagnosis, plan and need for follow up with the patient.   Ms De Leon is a 32-year-old woman who presents with occipital headache/upper neck pain.  No trauma to suggest spinal cord injury.  No focal numbness or weakness on exam.  Cranial nerves intact.  Had low-grade  temp yesterday and has some mild sore throat.  This may be consistent with a viral illness.  Will test for COVID, flu, RSV.  Has full range of motion of her neck intact, low suspicion for meningitis.  Possibility of tension headache.  Will apply lidocaine patch, give Toradol.  Likely discharge.    Medical Decision Making  The patient presented with a problem that is an acute illness with systemic symptoms.    The patient's evaluation involved:  ordering and review of 2 test(s) (see separate area of note for details)    The patient's management involved only simple and very low risk treatment.        New Prescriptions    No medications on file       Final diagnoses:   Neck pain   Tension headache       1/8/2023   [facility]     Rosemarie Finney MD  01/08/23 1132

## 2023-01-08 NOTE — DISCHARGE INSTRUCTIONS
Ibuprofen 600 mg every 6 hours as needed for pain with food and plenty of water. Discontinue use after 5 days.  Tylenol 500-1000 mg every 6 hours as needed for pain.  Do not take more than 4000 mg per day   Remove lidocaine patch 12 hours after application (930 pm)  Wash hands after handling and dispose of out of reach of children  After your skin has been patch free for 12 hours you can apply a new patch.  These are available over-the-counter as 4% lidocaine patches, brand name Salonpas  Remove immediately if you develop tingling particularly of the face, palpitations, skin irritation, or other concerns.  Do gentle stretching and range of motion exercises of her neck and shoulders.  Follow-up with your primary doctor in 5 to 7 days.  If you continue to have symptoms then you should consider referral to physical therapy.  Consider adjusting your pillow.  You generally need a flatter pillow if you are a back sleeper and a firmer pillow if you are a side sleeper.    Return to the emergency department for worsening pain, fever greater than 100 that does not respond to medications, vomiting, numbness or weakness of the arms, loss of consciousness, or if you have any new or changing symptoms or concerns.

## 2023-01-08 NOTE — ED TRIAGE NOTES
Pt presents with c/o a headache and neck pain for the past 5 days. Pt denies injury, no hx of migraines, Pt is able to touch her chin to her chest.

## 2023-10-16 ENCOUNTER — TELEPHONE (OUTPATIENT)
Dept: NURSING | Facility: CLINIC | Age: 33
End: 2023-10-16

## 2023-10-16 ENCOUNTER — HOSPITAL ENCOUNTER (EMERGENCY)
Facility: HOSPITAL | Age: 33
Discharge: HOME | End: 2023-10-16
Attending: NURSE PRACTITIONER | Admitting: NURSE PRACTITIONER
Payer: COMMERCIAL

## 2023-10-16 VITALS
TEMPERATURE: 98.7 F | HEART RATE: 109 BPM | OXYGEN SATURATION: 99 % | SYSTOLIC BLOOD PRESSURE: 128 MMHG | RESPIRATION RATE: 18 BRPM | DIASTOLIC BLOOD PRESSURE: 81 MMHG

## 2023-10-16 DIAGNOSIS — R09.89 SYMPTOMS OF UPPER RESPIRATORY INFECTION (URI): ICD-10-CM

## 2023-10-16 DIAGNOSIS — R53.83 FATIGUE: ICD-10-CM

## 2023-10-16 DIAGNOSIS — Z11.52 ENCOUNTER FOR SCREENING LABORATORY TESTING FOR COVID-19 VIRUS: Primary | ICD-10-CM

## 2023-10-16 LAB — SARS-COV-2 RNA RESP QL NAA+PROBE: POSITIVE

## 2023-10-16 PROCEDURE — G0463 HOSPITAL OUTPT CLINIC VISIT: HCPCS

## 2023-10-16 PROCEDURE — 87635 SARS-COV-2 COVID-19 AMP PRB: CPT | Performed by: NURSE PRACTITIONER

## 2023-10-16 PROCEDURE — 99213 OFFICE O/P EST LOW 20 MIN: CPT | Performed by: NURSE PRACTITIONER

## 2023-10-16 PROCEDURE — C9803 HOPD COVID-19 SPEC COLLECT: HCPCS

## 2023-10-16 ASSESSMENT — ENCOUNTER SYMPTOMS
FEVER: 0
RHINORRHEA: 1
FATIGUE: 1
SHORTNESS OF BREATH: 0
COUGH: 0
CHILLS: 0

## 2023-10-16 ASSESSMENT — ACTIVITIES OF DAILY LIVING (ADL): ADLS_ACUITY_SCORE: 35

## 2023-10-16 NOTE — ED PROVIDER NOTES
History     Chief Complaint   Patient presents with    URI     HPI  Jacquelin De Leon is a 33 year old female who presents to urgent care requesting COVID 19 testing. Jacquelin tells me that her  recently tested positive for COVID-19 and she ended up testing positive at home approximately 1 week ago. She felt her symptoms had improved. However, 3 days ago she started getting congestion, rhinorrhea and fatigue. No known fevers. No cough, SOB or chest pain. Drinking a lot of gatorade. She is requesting to be tested for COVID-19 today.     Also requesting a work excuse note.     Allergies:  No Known Allergies    Problem List:    Patient Active Problem List    Diagnosis Date Noted    Wrist laceration 11/21/2022     Priority: Medium    Suicide gesture, initial encounter (H) 11/21/2022     Priority: Medium        Past Medical History:    No past medical history on file.    Past Surgical History:    No past surgical history on file.    Family History:    No family history on file.    Social History:  Marital Status:  Single [1]  Social History     Tobacco Use    Smoking status: Former     Types: Cigarettes    Smokeless tobacco: Never   Substance Use Topics    Alcohol use: Not Currently     Comment: rarely    Drug use: Never        Medications:    amphetamine-dextroamphetamine (ADDERALL XR) 20 MG 24 hr capsule  aspirin (ASA) 325 MG EC tablet  atomoxetine (STRATTERA) 25 MG capsule  FLUoxetine (PROZAC) 40 MG capsule  fluticasone (FLONASE) 50 MCG/ACT nasal spray  metFORMIN (GLUCOPHAGE XR) 500 MG 24 hr tablet  paragard intrauterine copper device  spironolactone (ALDACTONE) 50 MG tablet          Review of Systems   Constitutional:  Positive for fatigue. Negative for chills and fever.   HENT:  Positive for congestion and rhinorrhea.    Respiratory:  Negative for cough and shortness of breath.    Cardiovascular:  Negative for chest pain.   All other systems reviewed and are negative.      Physical Exam   BP:  128/81  Pulse: 109  Temp: 98.7  F (37.1  C)  Resp: 18  SpO2: 99 %      Physical Exam  Vitals and nursing note reviewed.   Constitutional:       General: She is not in acute distress.     Appearance: Normal appearance. She is well-developed. She is not ill-appearing or toxic-appearing.   HENT:      Head: Normocephalic and atraumatic.      Right Ear: Tympanic membrane and ear canal normal.      Left Ear: Tympanic membrane and ear canal normal.      Nose: Nose normal.      Mouth/Throat:      Mouth: Mucous membranes are moist.   Eyes:      Conjunctiva/sclera: Conjunctivae normal.      Pupils: Pupils are equal, round, and reactive to light.   Cardiovascular:      Rate and Rhythm: Normal rate and regular rhythm.      Heart sounds: Normal heart sounds.   Pulmonary:      Effort: Pulmonary effort is normal. No respiratory distress.      Breath sounds: Normal breath sounds. No wheezing, rhonchi or rales.   Abdominal:      General: Abdomen is flat.   Musculoskeletal:      Cervical back: Normal range of motion and neck supple.   Skin:     General: Skin is warm and dry.      Coloration: Skin is not pale.   Neurological:      Mental Status: She is alert and oriented to person, place, and time.         ED Course                 Procedures         No results found for this or any previous visit (from the past 24 hour(s)).    Medications - No data to display    Assessments & Plan (with Medical Decision Making)   This is a pleasant and well-appearing 33-year-old female that presented requesting COVID-19 testing.  Patient tells me that she tested positive for COVID-19 at home approximately 1 week ago.  She felt her symptoms had improved, however, 3 days ago she developed increased nasal congestion, rhinorrhea and fatigue which prompted this visit.  Heart rate and rhythm are regular.  Her respirations are nonlabored.  Her oxygen saturation is 99% on room air.  She is afebrile.  COVID-19 test was done with results pending.    Discussed  with patient that her test may still come back positive which might not necessarily indicate a new infection since she just tested +1-week ago right after her symptoms that started.  At this time she was scheduled to go back to work tomorrow.  Due to her new symptoms and having just started 3 days ago I did give her a work excuse note for 2 more days.  Discussed recommended quarantine guidelines per CDC.  Her employer is already aware of the first COVID-19 positive result last week.  Advised her to take an oral decongestant and continue pushing fluids.  Tylenol or ibuprofen as needed for any pain.  Follow-up with primary doctor if no improvement in symptoms.  Return to urgent care or emergency department for any worsening or concerning symptoms.    I have reviewed the nursing notes.    I have reviewed the findings, diagnosis, plan and need for follow up with the patient.  This document was prepared using a combination of typing and voice generated software.  While every attempt was made for accuracy, spelling and grammatical errors may exist.       New Prescriptions    No medications on file       Final diagnoses:   Encounter for screening laboratory testing for COVID-19 virus   Symptoms of upper respiratory infection (URI)   Fatigue       10/16/2023   [facility]       Mpofu, Prudence, CNP  10/16/23 1421

## 2023-10-16 NOTE — DISCHARGE INSTRUCTIONS
Take over-the-counter Mucinex.  Tylenol or ibuprofen as needed for pain.  Continue drinking plenty of fluids.    Follow-up with your doctor as needed.    Return to urgent care or emergency room for any worsening or concerning symptoms.

## 2023-10-16 NOTE — ED TRIAGE NOTES
Pt presents with c/o having tested positive for covid on the 10th of this month and would like another test today   Pt reports increased chest congestion and nasal drainage   No otc meds taken today

## 2023-10-16 NOTE — Clinical Note
Jacquelin De Leon was seen and treated in our emergency department on 10/16/2023.  She may return to work on 10/18/2023.       If you have any questions or concerns, please don't hesitate to call.      Mpofu, Prudence, CNP

## 2023-12-17 ENCOUNTER — HEALTH MAINTENANCE LETTER (OUTPATIENT)
Age: 33
End: 2023-12-17

## 2024-04-11 ENCOUNTER — APPOINTMENT (OUTPATIENT)
Dept: OCCUPATIONAL MEDICINE | Facility: OTHER | Age: 34
End: 2024-04-11

## 2024-04-11 PROCEDURE — 99499 UNLISTED E&M SERVICE: CPT

## 2024-04-11 PROCEDURE — 97799 UNLISTED PHYSCL MED/REHAB PX: CPT

## 2024-04-11 PROCEDURE — 99000 SPECIMEN HANDLING OFFICE-LAB: CPT

## 2024-08-28 ENCOUNTER — HOSPITAL ENCOUNTER (EMERGENCY)
Facility: HOSPITAL | Age: 34
Discharge: HOME | End: 2024-08-28
Attending: PHYSICIAN ASSISTANT | Admitting: PHYSICIAN ASSISTANT
Payer: COMMERCIAL

## 2024-08-28 VITALS
TEMPERATURE: 98.5 F | HEART RATE: 89 BPM | SYSTOLIC BLOOD PRESSURE: 134 MMHG | RESPIRATION RATE: 19 BRPM | OXYGEN SATURATION: 100 % | DIASTOLIC BLOOD PRESSURE: 81 MMHG

## 2024-08-28 DIAGNOSIS — K04.7 DENTAL INFECTION: ICD-10-CM

## 2024-08-28 DIAGNOSIS — N89.8 VAGINAL DISCHARGE: ICD-10-CM

## 2024-08-28 LAB
ALBUMIN UR-MCNC: NEGATIVE MG/DL
APPEARANCE UR: CLEAR
BACTERIA #/AREA URNS HPF: ABNORMAL /HPF
BACTERIAL VAGINOSIS VAG-IMP: NEGATIVE
BILIRUB UR QL STRIP: NEGATIVE
C TRACH DNA SPEC QL PROBE+SIG AMP: NEGATIVE
CANDIDA DNA VAG QL NAA+PROBE: NOT DETECTED
CANDIDA GLABRATA / CANDIDA KRUSEI DNA: NOT DETECTED
COLOR UR AUTO: ABNORMAL
GLUCOSE UR STRIP-MCNC: NEGATIVE MG/DL
HCG UR QL: NEGATIVE
HGB UR QL STRIP: ABNORMAL
KETONES UR STRIP-MCNC: NEGATIVE MG/DL
LEUKOCYTE ESTERASE UR QL STRIP: ABNORMAL
N GONORRHOEA DNA SPEC QL NAA+PROBE: NEGATIVE
NITRATE UR QL: NEGATIVE
PH UR STRIP: 6.5 [PH] (ref 4.7–8)
RBC URINE: 4 /HPF
SP GR UR STRIP: 1 (ref 1–1.03)
SQUAMOUS EPITHELIAL: 2 /HPF
T VAGINALIS DNA VAG QL NAA+PROBE: NOT DETECTED
UROBILINOGEN UR STRIP-MCNC: NORMAL MG/DL
WBC URINE: 2 /HPF

## 2024-08-28 PROCEDURE — 0352U MULTIPLEX VAGINAL PANEL BY PCR: CPT | Performed by: PHYSICIAN ASSISTANT

## 2024-08-28 PROCEDURE — 87086 URINE CULTURE/COLONY COUNT: CPT | Performed by: PHYSICIAN ASSISTANT

## 2024-08-28 PROCEDURE — 81025 URINE PREGNANCY TEST: CPT | Performed by: PHYSICIAN ASSISTANT

## 2024-08-28 PROCEDURE — 87491 CHLMYD TRACH DNA AMP PROBE: CPT | Performed by: PHYSICIAN ASSISTANT

## 2024-08-28 PROCEDURE — G0463 HOSPITAL OUTPT CLINIC VISIT: HCPCS

## 2024-08-28 PROCEDURE — 99213 OFFICE O/P EST LOW 20 MIN: CPT | Performed by: PHYSICIAN ASSISTANT

## 2024-08-28 PROCEDURE — 81001 URINALYSIS AUTO W/SCOPE: CPT | Performed by: PHYSICIAN ASSISTANT

## 2024-08-28 ASSESSMENT — ACTIVITIES OF DAILY LIVING (ADL)
ADLS_ACUITY_SCORE: 35

## 2024-08-28 ASSESSMENT — COLUMBIA-SUICIDE SEVERITY RATING SCALE - C-SSRS
2. HAVE YOU ACTUALLY HAD ANY THOUGHTS OF KILLING YOURSELF IN THE PAST MONTH?: NO
6. HAVE YOU EVER DONE ANYTHING, STARTED TO DO ANYTHING, OR PREPARED TO DO ANYTHING TO END YOUR LIFE?: NO
1. IN THE PAST MONTH, HAVE YOU WISHED YOU WERE DEAD OR WISHED YOU COULD GO TO SLEEP AND NOT WAKE UP?: NO

## 2024-08-28 NOTE — ED TRIAGE NOTES
C/o dental pain     Broken tooth on left side, bottom. Swelling   Started the other day      Uti sx  discharge, pain during sex, frequency  Sx started about a week and half ago    Ibu taken

## 2024-08-29 ENCOUNTER — HOSPITAL ENCOUNTER (OUTPATIENT)
Dept: ULTRASOUND IMAGING | Facility: HOSPITAL | Age: 34
Discharge: HOME | End: 2024-08-29
Attending: PHYSICIAN ASSISTANT | Admitting: PHYSICIAN ASSISTANT
Payer: COMMERCIAL

## 2024-08-29 DIAGNOSIS — N89.8 VAGINAL DISCHARGE: ICD-10-CM

## 2024-08-29 PROCEDURE — 76830 TRANSVAGINAL US NON-OB: CPT

## 2024-08-29 PROCEDURE — 76856 US EXAM PELVIC COMPLETE: CPT

## 2024-08-29 NOTE — ED PROVIDER NOTES
History     Chief Complaint   Patient presents with    Dental Pain    Dysuria     HPI  Jacquelin De Leon is a 34 year old female who presents to the urgent care for evaluation of vaginal discharge for 1-1/2 weeks also notes that she has a left lower wisdom tooth that is giving her some pain and has noted swelling to the area.  She has no focal drainage about the tooth.  She denies any urinary symptoms she denies any dysuria hematuria frequency urgency.  Patient notes that she does have some dyspareunia.  She has no concerns for STDs.  She has an IUD in place.  She does not have any pelvic pain no back pain    Allergies:  No Known Allergies    Problem List:    Patient Active Problem List    Diagnosis Date Noted    Wrist laceration 11/21/2022     Priority: Medium    Suicide gesture, initial encounter (H) 11/21/2022     Priority: Medium        Past Medical History:    No past medical history on file.    Past Surgical History:    No past surgical history on file.    Family History:    No family history on file.    Social History:  Marital Status:  Single [1]  Social History     Tobacco Use    Smoking status: Former     Types: Cigarettes    Smokeless tobacco: Never   Substance Use Topics    Alcohol use: Not Currently     Comment: rarely    Drug use: Never        Medications:    amoxicillin-clavulanate (AUGMENTIN) 875-125 MG tablet  amphetamine-dextroamphetamine (ADDERALL XR) 20 MG 24 hr capsule  aspirin (ASA) 325 MG EC tablet  atomoxetine (STRATTERA) 25 MG capsule  FLUoxetine (PROZAC) 40 MG capsule  fluticasone (FLONASE) 50 MCG/ACT nasal spray  metFORMIN (GLUCOPHAGE XR) 500 MG 24 hr tablet  paragard intrauterine copper device  spironolactone (ALDACTONE) 50 MG tablet          Review of Systems   All other systems reviewed and are negative.      Physical Exam   BP: 134/81  Pulse: 89  Temp: 98.5  F (36.9  C)  Resp: 19  SpO2: 100 %      Physical Exam  Exam conducted with a chaperone present.   Constitutional:        General: She is not in acute distress.     Appearance: Normal appearance. She is normal weight. She is not ill-appearing, toxic-appearing or diaphoretic.   HENT:      Head: Normocephalic and atraumatic.      Right Ear: External ear normal.      Left Ear: External ear normal.      Mouth/Throat:     Eyes:      Extraocular Movements: Extraocular movements intact.      Conjunctiva/sclera: Conjunctivae normal.      Pupils: Pupils are equal, round, and reactive to light.   Cardiovascular:      Rate and Rhythm: Normal rate.   Pulmonary:      Effort: Pulmonary effort is normal. No respiratory distress.   Genitourinary:     Vagina: Vaginal discharge present. No tenderness.      Cervix: Discharge and friability present.      Uterus: Not tender.       Adnexa:         Right: No tenderness.        Comments: IUD string noted  No cervical tenderness   Musculoskeletal:         General: Normal range of motion.   Skin:     General: Skin is warm and dry.      Coloration: Skin is not jaundiced or pale.   Neurological:      Mental Status: She is alert and oriented to person, place, and time. Mental status is at baseline.      Cranial Nerves: No cranial nerve deficit.   Psychiatric:         Mood and Affect: Mood normal.         ED Course   vaginitis panel obtained and negative GC chlamydia panel obtained after negative hCG negative UA shows some leukocytes no white blood cells no nitrates.  Given that she is not having any urinary symptoms so not going to parenterally treat for a UTIshe has no pelvic tenderness no fevers  Patient had not wanted to wait around for the results of the GC chlamydia.  I called her and informed her of the results I will order an ultrasound to be done tomorrow and a follow-up with OB/GYN.  She is nontoxic afebrile no evidence of sepsis.  I will start her on Augmentin for her dental infection.  Discussed with her signs and symptoms to return to the ER immediately.  Procedures                  Results for orders  placed or performed during the hospital encounter of 08/28/24 (from the past 24 hour(s))   UA with Microscopic reflex to Culture    Specimen: Urine, Clean Catch   Result Value Ref Range    Color Urine Straw Colorless, Straw, Light Yellow, Yellow    Appearance Urine Clear Clear    Glucose Urine Negative Negative mg/dL    Bilirubin Urine Negative Negative    Ketones Urine Negative Negative mg/dL    Specific Gravity Urine 1.003 1.003 - 1.035    Blood Urine Trace (A) Negative    pH Urine 6.5 4.7 - 8.0    Protein Albumin Urine Negative Negative mg/dL    Urobilinogen Urine Normal Normal, 2.0 mg/dL    Nitrite Urine Negative Negative    Leukocyte Esterase Urine Large (A) Negative    Bacteria Urine Few (A) None Seen /HPF    RBC Urine 4 (H) <=2 /HPF    WBC Urine 2 <=5 /HPF    Squamous Epithelials Urine 2 (H) <=1 /HPF    Narrative    Urine Culture ordered based on laboratory criteria   Multiplex Vaginal Panel by PCR    Specimen: Vagina; Swab   Result Value Ref Range    Bacterial Vaginosis Organism DNA Negative Negative    Candida Group DNA Not Detected Not Detected    Candida glabrata / Leonie krusei DNA Not Detected Not Detected    Trichomonas vaginalis DNA Not Detected Not Detected    Narrative    The Xpert  Xpress MVP test, performed on the [employer], is an automated, qualitative in vitro diagnostic test for the detection of DNA targets from anaerobic bacteria associated with bacterial vaginosis, Candida species associated with vulvovaginal candidiasis, and Trichomonas vaginalis. The assay uses clinician-collected and self-collected vaginal swabs from patients who are symptomatic for vaginitis/ vaginosis. The Xpert  Xpress MVP test utilizes real-time polymerase chain reaction (PCR) for the amplification of specific DNA targets and utilizes fluorogenic target-specific hybridization probes to detect and differentiate DNA. It is intended to aid in the diagnosis of vaginal infections in women with a  clinical presentation consistent with bacterial vaginosis, vulvovaginal candidiasis, or trichomoniasis.   The assay targets three anaerobic microorgansims that are associated with bacterial vaginosis (BV). Other organisms that are not detected by the Xpert  Xpress MVP test have also been reported to be associated with BV. The BV organism and Candida species targets of the Xpert  Xpress MVP test can be commensal in women; positive results must be considered in conjunction with other clinical and patient information to determine the disease status.   HCG qualitative urine   Result Value Ref Range    hCG Urine Qualitative Negative Negative   Chlamydia trachomatis/Neisseria gonorrhoeae by PCR    Specimen: Vagina; Swab   Result Value Ref Range    Chlamydia Trachomatis Negative Negative    Neisseria gonorrhoeae Negative Negative    Narrative    Assay performed using [employer] real-time, reverse-transcriptase PCR.       Medications - No data to display    Assessments & Plan (with Medical Decision Making)     I have reviewed the nursing notes.    I have reviewed the findings, diagnosis, plan and need for follow up with the patient.      Discharge Medication List as of 8/28/2024  8:42 PM        START taking these medications    Details   amoxicillin-clavulanate (AUGMENTIN) 875-125 MG tablet Take 1 tablet by mouth 2 times daily for 10 days., Disp-20 tablet, R-0, E-Prescribe             Final diagnoses:   Vaginal discharge   Dental infection       8/28/2024   [facility]       Bigg Barajas PA-C  08/28/24 2047

## 2024-08-30 LAB — BACTERIA UR CULT: NORMAL

## 2024-09-26 ENCOUNTER — APPOINTMENT (OUTPATIENT)
Dept: CHIROPRACTIC MEDICINE | Facility: OTHER | Age: 34
End: 2024-09-26

## 2024-09-26 ENCOUNTER — APPOINTMENT (OUTPATIENT)
Dept: OCCUPATIONAL MEDICINE | Facility: OTHER | Age: 34
End: 2024-09-26

## 2024-09-26 PROCEDURE — 99499 UNLISTED E&M SERVICE: CPT

## 2024-10-28 ENCOUNTER — HOSPITAL ENCOUNTER (EMERGENCY)
Facility: HOSPITAL | Age: 34
Discharge: HOME | End: 2024-10-28
Attending: NURSE PRACTITIONER | Admitting: NURSE PRACTITIONER
Payer: COMMERCIAL

## 2024-10-28 VITALS
OXYGEN SATURATION: 100 % | RESPIRATION RATE: 15 BRPM | BODY MASS INDEX: 27.35 KG/M2 | WEIGHT: 170.2 LBS | TEMPERATURE: 98.1 F | SYSTOLIC BLOOD PRESSURE: 117 MMHG | DIASTOLIC BLOOD PRESSURE: 69 MMHG | HEIGHT: 66 IN | HEART RATE: 84 BPM

## 2024-10-28 DIAGNOSIS — R21 RASH: Primary | ICD-10-CM

## 2024-10-28 PROCEDURE — 99213 OFFICE O/P EST LOW 20 MIN: CPT | Performed by: NURSE PRACTITIONER

## 2024-10-28 PROCEDURE — G0463 HOSPITAL OUTPT CLINIC VISIT: HCPCS

## 2024-10-28 RX ORDER — SPIRONOLACTONE 100 MG/1
TABLET, FILM COATED ORAL
COMMUNITY
Start: 2024-04-10

## 2024-10-28 RX ORDER — PREDNISONE 20 MG/1
TABLET ORAL
Qty: 10 TABLET | Refills: 0 | Status: SHIPPED | OUTPATIENT
Start: 2024-10-28

## 2024-10-28 RX ORDER — GLYCOPYRROLATE 1 MG/1
1 TABLET ORAL 2 TIMES DAILY
COMMUNITY
Start: 2024-01-10

## 2024-10-28 RX ORDER — METHYLPHENIDATE HYDROCHLORIDE 54 MG/1
TABLET ORAL
COMMUNITY
Start: 2024-09-10

## 2024-10-28 RX ORDER — METHYLPHENIDATE HYDROCHLORIDE 36 MG/1
TABLET ORAL
COMMUNITY
Start: 2024-08-08

## 2024-10-28 RX ORDER — SEMAGLUTIDE 0.25 MG/.5ML
INJECTION, SOLUTION SUBCUTANEOUS
COMMUNITY
Start: 2024-04-23

## 2024-10-28 RX ORDER — BUSPIRONE HYDROCHLORIDE 10 MG/1
1 TABLET ORAL 3 TIMES DAILY
COMMUNITY
Start: 2024-01-11

## 2024-10-28 ASSESSMENT — ENCOUNTER SYMPTOMS
SHORTNESS OF BREATH: 0
NAUSEA: 0
VOMITING: 0
CHILLS: 0
FEVER: 0
PSYCHIATRIC NEGATIVE: 1
DIARRHEA: 0

## 2024-10-28 NOTE — ED TRIAGE NOTES
Pt presents with c/o rash on lower back bilaterally. Reports she is unsure what caused it. States that it started on Saturday night. Today has worsened and rash is starting to burn and itch more. Reports new dryer sheets but has been using for the past month. Cannot think of anything else that would have caused rash. No otc meds.

## 2024-10-28 NOTE — ED PROVIDER NOTES
History     Chief Complaint   Patient presents with    Rash     HPI  Jacquelin De Leon is a 34 year old female who presents to urgent care today ambulatory with complaints of a rash to buttocks.  Symptoms started 2 days ago.  Has new dryer sheets but has been using them for the past month.  No other new medication, laundry detergent, soap.  Has not stayed anywhere new.  Denies any exposure to poison ivy, has not been in the [place].  Rash itches a lot.  No drainage.  Attempted triamcinolone cream that her  has without improvement.  No other concerns.    Allergies:  No Known Allergies    Problem List:    Patient Active Problem List    Diagnosis Date Noted    Wrist laceration 11/21/2022     Priority: Medium    Suicide gesture, initial encounter (H) 11/21/2022     Priority: Medium        Past Medical History:    No past medical history on file.    Past Surgical History:    No past surgical history on file.    Family History:    No family history on file.    Social History:  Marital Status:  Single [1]  Social History     Tobacco Use    Smoking status: Former     Types: Cigarettes    Smokeless tobacco: Never   Substance Use Topics    Alcohol use: Not Currently     Comment: rarely    Drug use: Never        Medications:    busPIRone (BUSPAR) 10 MG tablet  glycopyrrolate (ROBINUL) 1 MG tablet  levonorgestrel (MIRENA) 52 MG (20 mcg/day) IUD  methylphenidate HCl ER, OSM, (CONCERTA) 36 MG CR tablet  methylphenidate HCl ER, OSM, (CONCERTA) 54 MG CR tablet  predniSONE (DELTASONE) 20 MG tablet  Semaglutide-Weight Management (WEGOVY) 1.7 MG/0.75ML pen  spironolactone (ALDACTONE) 100 MG tablet  WEGOVY 0.25 MG/0.5ML pen  amphetamine-dextroamphetamine (ADDERALL XR) 20 MG 24 hr capsule  aspirin (ASA) 325 MG EC tablet  atomoxetine (STRATTERA) 25 MG capsule  FLUoxetine (PROZAC) 40 MG capsule  fluticasone (FLONASE) 50 MCG/ACT nasal spray  metFORMIN (GLUCOPHAGE XR) 500 MG 24 hr tablet  paragard intrauterine copper  "device  spironolactone (ALDACTONE) 50 MG tablet      Review of Systems   Constitutional:  Negative for chills and fever.   Respiratory:  Negative for shortness of breath.    Cardiovascular:  Negative for chest pain.   Gastrointestinal:  Negative for diarrhea, nausea and vomiting.   Musculoskeletal:  Negative for gait problem.   Skin:  Positive for rash (buttocks).   Psychiatric/Behavioral: Negative.       Physical Exam   BP: 117/69  Pulse: 84  Temp: 98.1  F (36.7  C)  Resp: 15  Height: 167.6 cm (5' 6\")  Weight: 77.2 kg (170 lb 3.2 oz)  SpO2: 100 %    Physical Exam  Vitals and nursing note reviewed.   Constitutional:       General: She is not in acute distress.     Appearance: Normal appearance. She is not ill-appearing or toxic-appearing.   Cardiovascular:      Rate and Rhythm: Normal rate and regular rhythm.      Pulses: Normal pulses.      Heart sounds: Normal heart sounds.   Pulmonary:      Effort: Pulmonary effort is normal.      Breath sounds: Normal breath sounds.   Skin:     General: Skin is warm and dry.      Findings: Rash present. Rash is urticarial.   Neurological:      Mental Status: She is alert.   Psychiatric:         Mood and Affect: Mood normal.       ED Course     Procedures    No results found for this or any previous visit (from the past 24 hours).    Medications - No data to display    Assessments & Plan (with Medical Decision Making)     I have reviewed the nursing notes.    I have reviewed the findings, diagnosis, plan and need for follow up with the patient.  (R21) Rash  (primary encounter diagnosis)  Plan:   Patient ambulatory with a nontoxic appearance.  Urticarial rash to right and left buttocks.  No drainage.  Itches.  No signs of infection.  New dryer sheets that patient has been using for a month, no other new exposures that patient is aware of.  Will start patient on short course of prednisone for itching.  Monitor for any possible causes of allergic reaction.  Monitor for any drainage " or signs of bacterial infection.  Follow-up with primary care provider or return to urgent care/ED with any worsening in condition or additional concerns.  Patient in agreement treatment plan.    Discharge Medication List as of 10/28/2024  5:41 PM        START taking these medications    Details   predniSONE (DELTASONE) 20 MG tablet Take two tablets (= 40mg) each day for 5 (five) days, Disp-10 tablet, R-0, E-Prescribe           Final diagnoses:   Rash     10/28/2024   [facility]       Tona Mcneil, DESHAWN  10/28/24 1151

## 2024-10-28 NOTE — DISCHARGE INSTRUCTIONS
Prednisone as ordered    Follow-up with primary care provider or return to urgent care/ED with any worsening in condition or additional concerns.

## 2025-01-09 NOTE — ED NOTES
DEC ordered by provider, awaiting call from DEC   SURVEY:     You may be receiving a survey from [facility] [employer] regarding your visit today.     Please complete the survey to enable us to provide the highest quality of care to you and your family.     If you cannot score us a very good on any question, please call the office to discuss how we could have made your experience a very good one.     Thank you,    Christopher Fu, APRN-CNP  Leeanne Casey, APRN-CNP  Brittni, LPN  Alisha, CMA  Pedrito, CMA  [place], CMA  Harriett, PCA  Sonia, CMA  Gin, PM

## 2025-01-12 ENCOUNTER — HEALTH MAINTENANCE LETTER (OUTPATIENT)
Age: 35
End: 2025-01-12